# Patient Record
Sex: FEMALE | Race: WHITE | Employment: FULL TIME | URBAN - METROPOLITAN AREA
[De-identification: names, ages, dates, MRNs, and addresses within clinical notes are randomized per-mention and may not be internally consistent; named-entity substitution may affect disease eponyms.]

---

## 2019-06-12 ENCOUNTER — HOSPITAL ENCOUNTER (OUTPATIENT)
Dept: PREADMISSION TESTING | Age: 64
Discharge: HOME | End: 2019-06-16
Payer: COMMERCIAL

## 2019-06-12 VITALS
SYSTOLIC BLOOD PRESSURE: 129 MMHG | HEART RATE: 56 BPM | WEIGHT: 132.2 LBS | OXYGEN SATURATION: 98 % | DIASTOLIC BLOOD PRESSURE: 74 MMHG | BODY MASS INDEX: 21.24 KG/M2 | TEMPERATURE: 98.1 F | RESPIRATION RATE: 16 BRPM | HEIGHT: 66 IN

## 2019-06-12 DIAGNOSIS — J34.9 UNSPECIFIED DISORDER OF NOSE AND NASAL SINUSES: ICD-10-CM

## 2019-06-12 DIAGNOSIS — Z98.890: ICD-10-CM

## 2019-06-12 DIAGNOSIS — J32.2 CHRONIC ETHMOIDAL SINUSITIS: ICD-10-CM

## 2019-06-12 DIAGNOSIS — J34.3 HYPERTROPHY OF NASAL TURBINATES: ICD-10-CM

## 2019-06-12 DIAGNOSIS — J32.0 CHRONIC MAXILLARY SINUSITIS: ICD-10-CM

## 2019-06-12 DIAGNOSIS — J34.2 DEVIATED NASAL SEPTUM: ICD-10-CM

## 2019-06-12 DIAGNOSIS — J32.3 CHRONIC SPHENOIDAL SINUSITIS: ICD-10-CM

## 2019-06-12 LAB
ANION GAP SERPL CALCULATED.3IONS-SCNC: 15 MEQ/L (ref 9–15)
BUN BLDV-MCNC: 13 MG/DL (ref 8–23)
CALCIUM SERPL-MCNC: 9.6 MG/DL (ref 8.5–9.9)
CHLORIDE BLD-SCNC: 106 MEQ/L (ref 95–107)
CO2: 23 MEQ/L (ref 20–31)
CREAT SERPL-MCNC: 0.47 MG/DL (ref 0.5–0.9)
EKG ATRIAL RATE: 59 BPM
EKG P AXIS: 45 DEGREES
EKG P-R INTERVAL: 168 MS
EKG Q-T INTERVAL: 434 MS
EKG QRS DURATION: 82 MS
EKG QTC CALCULATION (BAZETT): 429 MS
EKG R AXIS: 42 DEGREES
EKG T AXIS: 28 DEGREES
EKG VENTRICULAR RATE: 59 BPM
GFR AFRICAN AMERICAN: >60
GFR NON-AFRICAN AMERICAN: >60
GLUCOSE BLD-MCNC: 92 MG/DL (ref 70–99)
HCT VFR BLD CALC: 40.1 % (ref 37–47)
HEMOGLOBIN: 13.7 G/DL (ref 12–16)
MCH RBC QN AUTO: 30.1 PG (ref 27–31.3)
MCHC RBC AUTO-ENTMCNC: 34.1 % (ref 33–37)
MCV RBC AUTO: 88.3 FL (ref 82–100)
PDW BLD-RTO: 13.3 % (ref 11.5–14.5)
PLATELET # BLD: 223 K/UL (ref 130–400)
POTASSIUM SERPL-SCNC: 4.3 MEQ/L (ref 3.4–4.9)
RBC # BLD: 4.54 M/UL (ref 4.2–5.4)
SODIUM BLD-SCNC: 144 MEQ/L (ref 135–144)
WBC # BLD: 5 K/UL (ref 4.8–10.8)

## 2019-06-12 PROCEDURE — 93005 ELECTROCARDIOGRAM TRACING: CPT | Performed by: NURSE PRACTITIONER

## 2019-06-12 PROCEDURE — 80048 BASIC METABOLIC PNL TOTAL CA: CPT

## 2019-06-12 PROCEDURE — 85027 COMPLETE CBC AUTOMATED: CPT

## 2019-06-12 RX ORDER — SODIUM CHLORIDE 0.9 % (FLUSH) 0.9 %
10 SYRINGE (ML) INJECTION EVERY 12 HOURS SCHEDULED
Status: CANCELLED | OUTPATIENT
Start: 2019-06-20

## 2019-06-12 RX ORDER — M-VIT,TX,IRON,MINS/CALC/FOLIC 27MG-0.4MG
1 TABLET ORAL DAILY
COMMUNITY

## 2019-06-12 RX ORDER — VITAMIN E 268 MG
400 CAPSULE ORAL DAILY
COMMUNITY

## 2019-06-12 RX ORDER — CALCIUM CARBONATE/VITAMIN D3 600 MG-10
1 TABLET ORAL DAILY
COMMUNITY

## 2019-06-12 RX ORDER — SODIUM CHLORIDE, SODIUM LACTATE, POTASSIUM CHLORIDE, CALCIUM CHLORIDE 600; 310; 30; 20 MG/100ML; MG/100ML; MG/100ML; MG/100ML
INJECTION, SOLUTION INTRAVENOUS CONTINUOUS
Status: CANCELLED | OUTPATIENT
Start: 2019-06-20

## 2019-06-12 RX ORDER — SODIUM CHLORIDE 0.9 % (FLUSH) 0.9 %
10 SYRINGE (ML) INJECTION PRN
Status: CANCELLED | OUTPATIENT
Start: 2019-06-20

## 2019-06-12 RX ORDER — RANITIDINE 150 MG/1
150 TABLET ORAL NIGHTLY PRN
COMMUNITY
Start: 2014-02-28

## 2019-06-12 RX ORDER — CEFAZOLIN SODIUM 2 G/50ML
2 SOLUTION INTRAVENOUS ONCE
Status: CANCELLED | OUTPATIENT
Start: 2019-06-20

## 2019-06-12 RX ORDER — IPRATROPIUM BROMIDE 21 UG/1
2 SPRAY, METERED NASAL DAILY
COMMUNITY
End: 2021-07-29 | Stop reason: SDUPTHER

## 2019-06-12 RX ORDER — LIDOCAINE HYDROCHLORIDE 10 MG/ML
1 INJECTION, SOLUTION EPIDURAL; INFILTRATION; INTRACAUDAL; PERINEURAL
Status: CANCELLED | OUTPATIENT
Start: 2019-06-20 | End: 2019-06-20

## 2019-06-12 RX ORDER — ACETAMINOPHEN 325 MG/1
325-650 TABLET ORAL
Status: ON HOLD | COMMUNITY
Start: 2014-01-24 | End: 2019-06-20 | Stop reason: HOSPADM

## 2019-06-12 SDOH — HEALTH STABILITY: MENTAL HEALTH: HOW OFTEN DO YOU HAVE A DRINK CONTAINING ALCOHOL?: NEVER

## 2019-06-12 ASSESSMENT — ENCOUNTER SYMPTOMS
WHEEZING: 0
VOMITING: 0
EYE PAIN: 0
COUGH: 0
NAUSEA: 0
CONSTIPATION: 0
SINUS PAIN: 1
EYE DISCHARGE: 0
SORE THROAT: 0
ABDOMINAL PAIN: 0
DIARRHEA: 0
TROUBLE SWALLOWING: 0
RHINORRHEA: 1
APNEA: 0
SINUS PRESSURE: 1
BLOOD IN STOOL: 0
SHORTNESS OF BREATH: 0

## 2019-06-12 NOTE — H&P (VIEW-ONLY)
Nurse Practitioner History and Physical      CHIEF COMPLAINT:  Chronic sinus issues    HISTORY OF PRESENT ILLNESS:      The patient is a 66 y.o. female with significant past medical history of deviated nasal septum, hypertrophy of nasal turbinates, chronic ethmoidal, maxillary and sphenoidal sinusitis who presents for septoplasty, microdebrider assisted turbinoplasty and out-fracturing balloon sinuplasty of right maxillary, bilateral sphenoid sinus, resection of left keesha bullosa. Patient with chronic sinus issues. Scheduled for surgery.      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         Medications Prior to Admission:    Current Outpatient Medications   Medication Sig Dispense Refill    ranitidine (ZANTAC) 150 MG tablet Take 150 mg by mouth      acetaminophen (TYLENOL) 325 MG tablet Take 325-650 mg by mouth      OMEPRAZOLE PO Take 20 mg by mouth daily      Probiotic Product (ALIGN PO) Take by mouth daily      Calcium Polycarbophil (FIBER-CAPS PO) Take 1 capsule by mouth daily      Multiple Vitamins-Minerals (THERAPEUTIC MULTIVITAMIN-MINERALS) tablet Take 1 tablet by mouth daily      LUTEIN-ZEAXANTHIN PO Take 1 tablet by mouth daily      vitamin E 400 UNIT capsule Take 400 Units by mouth daily      GLUCOSAMINE-CHONDROITIN DS PO Take 1 tablet by mouth daily      Calcium Carb-Cholecalciferol (CALCIUM-VITAMIN D) 500-200 MG-UNIT per tablet Take 1 tablet by mouth daily      Omega 3 1200 MG CAPS Take 1 capsule by mouth daily      ipratropium (ATROVENT) 0.03 % nasal spray 2 sprays by Each Nostril route daily       No current facility-administered medications for this encounter. Allergies:  Aspirin; Augmentin [amoxicillin-pot clavulanate]; Codeine; and Pseudoephedrine    Social History:   Social History     Socioeconomic History    Marital status:       Spouse name: Not on file    Number of children: Not on file    Years of education: Not on file    Highest education level: Not on file   Occupational History    Not on file   Social Needs    Financial resource strain: Not on file    Food insecurity:     Worry: Not on file     Inability: Not on file    Transportation needs:     Medical: Not on file     Non-medical: Not on file   Tobacco Use    Smoking status: Former Smoker     Last attempt to quit: 6/12/2009     Years since quitting: 10.0    Smokeless tobacco: Never Used   Substance and Sexual Activity    Alcohol use: Never     Frequency: Never    Drug use: Never    Sexual activity: Not on file   Lifestyle    Physical activity:     Days per week: Not on file     Minutes per session: Not on file    Stress: Not on file   Relationships    Social connections:     Talks on phone: Not on file     Gets together: Not on file     Attends Worship service: Not on file     Active member of club or organization: Not on file     Attends meetings of clubs or organizations: Not on file     Relationship status: Not on file    Intimate partner violence:     Fear of current or ex partner: Not on file     Emotionally abused: Not on file     Physically abused: Not on file     Forced sexual activity: Not on file   Other Topics Concern    Not on file   Social History Narrative    Not on file       Family History:       Problem Relation Age of Onset    Stroke Mother         several     High Cholesterol Mother     Heart Disease Father     Heart Attack Father     No Known Problems Sister     High Blood Pressure Brother     High Cholesterol Brother     Mental Illness Daughter bipolar, ADHD    No Known Problems Son        Review of Systems   Constitutional: Negative for chills, fatigue and fever. HENT: Positive for congestion, rhinorrhea, sinus pressure and sinus pain. Negative for ear discharge, ear pain, postnasal drip, sneezing, sore throat and trouble swallowing. Eyes: Negative for pain, discharge and visual disturbance. Respiratory: Negative for apnea, cough, shortness of breath and wheezing. Cardiovascular: Negative for chest pain, palpitations and leg swelling. Gastrointestinal: Negative for abdominal pain, blood in stool, constipation, diarrhea, nausea and vomiting. Upset stomach at times, takes ranitidine and omeprazole   Endocrine: Negative for polydipsia, polyphagia and polyuria. Genitourinary: Negative for difficulty urinating, dysuria, frequency and hematuria. Musculoskeletal: Negative for myalgias. Skin: Negative for rash and wound. Allergic/Immunologic: Positive for environmental allergies. Neurological: Positive for headaches (at times due to sinuses). Negative for dizziness, seizures and light-headedness. Denies LOC and recent falls   Hematological: Does not bruise/bleed easily. Psychiatric/Behavioral: Negative for dysphoric mood. The patient is nervous/anxious (at times). Vitals:  /74   Pulse 56   Temp 98.1 °F (36.7 °C) (Temporal)   Resp 16   Ht 5' 5.5\" (1.664 m)   Wt 132 lb 3.2 oz (60 kg)   LMP 06/12/2004   SpO2 98%   BMI 21.66 kg/m²     Physical Exam   Constitutional: She is oriented to person, place, and time. Vital signs are normal. She appears well-developed and well-nourished. She does not have a sickly appearance. HENT:   Head: Normocephalic and atraumatic. Right Ear: Hearing, tympanic membrane, external ear and ear canal normal.   Left Ear: Hearing, tympanic membrane, external ear and ear canal normal.   Nose: Mucosal edema and septal deviation present. No rhinorrhea or sinus tenderness.  Right sinus exhibits maxillary sinus tenderness. Right sinus exhibits no frontal sinus tenderness. Left sinus exhibits maxillary sinus tenderness. Left sinus exhibits no frontal sinus tenderness. Mouth/Throat: Uvula is midline, oropharynx is clear and moist and mucous membranes are normal.   Eyes: Pupils are equal, round, and reactive to light. Conjunctivae, EOM and lids are normal.   Neck: Normal range of motion. Neck supple. No JVD present. Carotid bruit is not present. Cardiovascular: Normal rate, regular rhythm, S1 normal, S2 normal and normal heart sounds. No murmur heard. Pulmonary/Chest: Effort normal and breath sounds normal. She has no wheezes. Abdominal: Soft. Normal appearance and bowel sounds are normal. There is no hepatosplenomegaly. There is no tenderness. There is no CVA tenderness. Genitourinary:   Genitourinary Comments: Exam deferred   Musculoskeletal: Normal range of motion. Lymphadenopathy:     She has no cervical adenopathy. Right: No supraclavicular adenopathy present. Left: No supraclavicular adenopathy present. Neurological: She is alert and oriented to person, place, and time. Skin: Skin is warm and dry. Capillary refill takes less than 2 seconds. Psychiatric: She has a normal mood and affect. Her speech is normal.         Assessment:  Patient Active Problem List   Diagnosis    Deviated nasal septum    Hypertrophy of nasal turbinates    Chronic maxillary sinusitis    Chronic sphenoidal sinusitis    Unspecified disorder of nose and nasal sinuses    Chronic ethmoidal sinusitis    History of ethmoidectomy         Plan:  Scheduled for sphenoidal sinusitis who presents for septoplasty, microdebrider assisted turbinoplasty and out-fracturing balloon sinuplasty of right maxillary, bilateral sphenoid sinus, resection of left keesha bullosa.      Kacey Lakhani, BARTOLO - CNP  6/12/2019  10:35 AM

## 2019-06-12 NOTE — H&P
Nurse Practitioner History and Physical      CHIEF COMPLAINT:  Chronic sinus issues    HISTORY OF PRESENT ILLNESS:      The patient is a 66 y.o. female with significant past medical history of deviated nasal septum, hypertrophy of nasal turbinates, chronic ethmoidal, maxillary and sphenoidal sinusitis who presents for septoplasty, microdebrider assisted turbinoplasty and out-fracturing balloon sinuplasty of right maxillary, bilateral sphenoid sinus, resection of left keesha bullosa. Patient with chronic sinus issues. Scheduled for surgery.      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         Medications Prior to Admission:    Current Outpatient Medications   Medication Sig Dispense Refill    ranitidine (ZANTAC) 150 MG tablet Take 150 mg by mouth      acetaminophen (TYLENOL) 325 MG tablet Take 325-650 mg by mouth      OMEPRAZOLE PO Take 20 mg by mouth daily      Probiotic Product (ALIGN PO) Take by mouth daily      Calcium Polycarbophil (FIBER-CAPS PO) Take 1 capsule by mouth daily      Multiple Vitamins-Minerals (THERAPEUTIC MULTIVITAMIN-MINERALS) tablet Take 1 tablet by mouth daily      LUTEIN-ZEAXANTHIN PO Take 1 tablet by mouth daily      vitamin E 400 UNIT capsule Take 400 Units by mouth daily      GLUCOSAMINE-CHONDROITIN DS PO Take 1 tablet by mouth daily      Calcium Carb-Cholecalciferol (CALCIUM-VITAMIN D) 500-200 MG-UNIT per tablet Take 1 tablet by mouth daily      Omega 3 1200 MG CAPS Take 1 capsule by mouth daily      ipratropium (ATROVENT) 0.03 % nasal spray 2 sprays by Each Nostril route daily       No current facility-administered medications for this encounter. Allergies:  Aspirin; Augmentin [amoxicillin-pot clavulanate]; Codeine; and Pseudoephedrine    Social History:   Social History     Socioeconomic History    Marital status:       Spouse name: Not on file    Number of children: Not on file    Years of education: Not on file    Highest education level: Not on file   Occupational History    Not on file   Social Needs    Financial resource strain: Not on file    Food insecurity:     Worry: Not on file     Inability: Not on file    Transportation needs:     Medical: Not on file     Non-medical: Not on file   Tobacco Use    Smoking status: Former Smoker     Last attempt to quit: 6/12/2009     Years since quitting: 10.0    Smokeless tobacco: Never Used   Substance and Sexual Activity    Alcohol use: Never     Frequency: Never    Drug use: Never    Sexual activity: Not on file   Lifestyle    Physical activity:     Days per week: Not on file     Minutes per session: Not on file    Stress: Not on file   Relationships    Social connections:     Talks on phone: Not on file     Gets together: Not on file     Attends Jew service: Not on file     Active member of club or organization: Not on file     Attends meetings of clubs or organizations: Not on file     Relationship status: Not on file    Intimate partner violence:     Fear of current or ex partner: Not on file     Emotionally abused: Not on file     Physically abused: Not on file     Forced sexual activity: Not on file   Other Topics Concern    Not on file   Social History Narrative    Not on file       Family History:       Problem Relation Age of Onset    Stroke Mother         several     High Cholesterol Mother     Heart Disease Father     Heart Attack Father     No Known Problems Sister     High Blood Pressure Brother     High Cholesterol Brother     Mental Illness Daughter bipolar, ADHD    No Known Problems Son        Review of Systems   Constitutional: Negative for chills, fatigue and fever. HENT: Positive for congestion, rhinorrhea, sinus pressure and sinus pain. Negative for ear discharge, ear pain, postnasal drip, sneezing, sore throat and trouble swallowing. Eyes: Negative for pain, discharge and visual disturbance. Respiratory: Negative for apnea, cough, shortness of breath and wheezing. Cardiovascular: Negative for chest pain, palpitations and leg swelling. Gastrointestinal: Negative for abdominal pain, blood in stool, constipation, diarrhea, nausea and vomiting. Upset stomach at times, takes ranitidine and omeprazole   Endocrine: Negative for polydipsia, polyphagia and polyuria. Genitourinary: Negative for difficulty urinating, dysuria, frequency and hematuria. Musculoskeletal: Negative for myalgias. Skin: Negative for rash and wound. Allergic/Immunologic: Positive for environmental allergies. Neurological: Positive for headaches (at times due to sinuses). Negative for dizziness, seizures and light-headedness. Denies LOC and recent falls   Hematological: Does not bruise/bleed easily. Psychiatric/Behavioral: Negative for dysphoric mood. The patient is nervous/anxious (at times). Vitals:  /74   Pulse 56   Temp 98.1 °F (36.7 °C) (Temporal)   Resp 16   Ht 5' 5.5\" (1.664 m)   Wt 132 lb 3.2 oz (60 kg)   LMP 06/12/2004   SpO2 98%   BMI 21.66 kg/m²     Physical Exam   Constitutional: She is oriented to person, place, and time. Vital signs are normal. She appears well-developed and well-nourished. She does not have a sickly appearance. HENT:   Head: Normocephalic and atraumatic. Right Ear: Hearing, tympanic membrane, external ear and ear canal normal.   Left Ear: Hearing, tympanic membrane, external ear and ear canal normal.   Nose: Mucosal edema and septal deviation present. No rhinorrhea or sinus tenderness.  Right sinus

## 2019-06-14 PROCEDURE — 93010 ELECTROCARDIOGRAM REPORT: CPT | Performed by: INTERNAL MEDICINE

## 2019-06-20 ENCOUNTER — ANESTHESIA EVENT (OUTPATIENT)
Dept: OPERATING ROOM | Age: 64
End: 2019-06-20
Payer: COMMERCIAL

## 2019-06-20 ENCOUNTER — HOSPITAL ENCOUNTER (OUTPATIENT)
Age: 64
Setting detail: OUTPATIENT SURGERY
Discharge: HOME | End: 2019-06-20
Attending: OTOLARYNGOLOGY | Admitting: OTOLARYNGOLOGY
Payer: COMMERCIAL

## 2019-06-20 ENCOUNTER — HOSPITAL ENCOUNTER (OUTPATIENT)
Dept: GENERAL RADIOLOGY | Age: 64
Setting detail: OUTPATIENT SURGERY
Discharge: HOME | End: 2019-06-22
Attending: OTOLARYNGOLOGY
Payer: COMMERCIAL

## 2019-06-20 ENCOUNTER — ANESTHESIA (OUTPATIENT)
Dept: OPERATING ROOM | Age: 64
End: 2019-06-20
Payer: COMMERCIAL

## 2019-06-20 VITALS — OXYGEN SATURATION: 100 % | SYSTOLIC BLOOD PRESSURE: 154 MMHG | TEMPERATURE: 95.9 F | DIASTOLIC BLOOD PRESSURE: 77 MMHG

## 2019-06-20 VITALS
RESPIRATION RATE: 16 BRPM | TEMPERATURE: 97.3 F | HEIGHT: 66 IN | WEIGHT: 132.2 LBS | OXYGEN SATURATION: 97 % | SYSTOLIC BLOOD PRESSURE: 146 MMHG | HEART RATE: 71 BPM | BODY MASS INDEX: 21.24 KG/M2 | DIASTOLIC BLOOD PRESSURE: 80 MMHG

## 2019-06-20 DIAGNOSIS — J32.0 CHRONIC MAXILLARY SINUSITIS: Primary | ICD-10-CM

## 2019-06-20 DIAGNOSIS — J34.2 DEVIATED NASAL SEPTUM: ICD-10-CM

## 2019-06-20 DIAGNOSIS — R52 PAIN: ICD-10-CM

## 2019-06-20 DIAGNOSIS — J34.9 UNSPECIFIED DISORDER OF NOSE AND NASAL SINUSES: ICD-10-CM

## 2019-06-20 DIAGNOSIS — J34.3 HYPERTROPHY OF NASAL TURBINATES: ICD-10-CM

## 2019-06-20 DIAGNOSIS — J32.3 CHRONIC SPHENOIDAL SINUSITIS: ICD-10-CM

## 2019-06-20 DIAGNOSIS — J32.2 CHRONIC ETHMOIDAL SINUSITIS: ICD-10-CM

## 2019-06-20 PROCEDURE — 2709999900 HC NON-CHARGEABLE SUPPLY: Performed by: OTOLARYNGOLOGY

## 2019-06-20 PROCEDURE — 3209999900 FLUORO FOR SURGICAL PROCEDURES

## 2019-06-20 PROCEDURE — 6370000000 HC RX 637 (ALT 250 FOR IP): Performed by: OTOLARYNGOLOGY

## 2019-06-20 PROCEDURE — 2500000003 HC RX 250 WO HCPCS: Performed by: NURSE ANESTHETIST, CERTIFIED REGISTERED

## 2019-06-20 PROCEDURE — C1729 CATH, DRAINAGE: HCPCS | Performed by: OTOLARYNGOLOGY

## 2019-06-20 PROCEDURE — 6370000000 HC RX 637 (ALT 250 FOR IP): Performed by: ANESTHESIOLOGY

## 2019-06-20 PROCEDURE — 2500000003 HC RX 250 WO HCPCS: Performed by: OTOLARYNGOLOGY

## 2019-06-20 PROCEDURE — 88305 TISSUE EXAM BY PATHOLOGIST: CPT

## 2019-06-20 PROCEDURE — C1887 CATHETER, GUIDING: HCPCS | Performed by: OTOLARYNGOLOGY

## 2019-06-20 PROCEDURE — 7100000010 HC PHASE II RECOVERY - FIRST 15 MIN: Performed by: OTOLARYNGOLOGY

## 2019-06-20 PROCEDURE — 7100000001 HC PACU RECOVERY - ADDTL 15 MIN: Performed by: OTOLARYNGOLOGY

## 2019-06-20 PROCEDURE — 88304 TISSUE EXAM BY PATHOLOGIST: CPT

## 2019-06-20 PROCEDURE — 2720000010 HC SURG SUPPLY STERILE: Performed by: OTOLARYNGOLOGY

## 2019-06-20 PROCEDURE — 2500000003 HC RX 250 WO HCPCS: Performed by: NURSE PRACTITIONER

## 2019-06-20 PROCEDURE — 6360000002 HC RX W HCPCS: Performed by: ANESTHESIOLOGY

## 2019-06-20 PROCEDURE — C1726 CATH, BAL DIL, NON-VASCULAR: HCPCS | Performed by: OTOLARYNGOLOGY

## 2019-06-20 PROCEDURE — 2580000003 HC RX 258: Performed by: NURSE PRACTITIONER

## 2019-06-20 PROCEDURE — 7100000000 HC PACU RECOVERY - FIRST 15 MIN: Performed by: OTOLARYNGOLOGY

## 2019-06-20 PROCEDURE — 3600000014 HC SURGERY LEVEL 4 ADDTL 15MIN: Performed by: OTOLARYNGOLOGY

## 2019-06-20 PROCEDURE — 6360000002 HC RX W HCPCS: Performed by: NURSE ANESTHETIST, CERTIFIED REGISTERED

## 2019-06-20 PROCEDURE — 3700000001 HC ADD 15 MINUTES (ANESTHESIA): Performed by: OTOLARYNGOLOGY

## 2019-06-20 PROCEDURE — 2580000003 HC RX 258: Performed by: OTOLARYNGOLOGY

## 2019-06-20 PROCEDURE — 3700000000 HC ANESTHESIA ATTENDED CARE: Performed by: OTOLARYNGOLOGY

## 2019-06-20 PROCEDURE — 88311 DECALCIFY TISSUE: CPT

## 2019-06-20 PROCEDURE — 3600000004 HC SURGERY LEVEL 4 BASE: Performed by: OTOLARYNGOLOGY

## 2019-06-20 PROCEDURE — 7100000011 HC PHASE II RECOVERY - ADDTL 15 MIN: Performed by: OTOLARYNGOLOGY

## 2019-06-20 RX ORDER — OXYCODONE HYDROCHLORIDE AND ACETAMINOPHEN 5; 325 MG/1; MG/1
1 TABLET ORAL EVERY 4 HOURS PRN
Status: DISCONTINUED | OUTPATIENT
Start: 2019-06-20 | End: 2019-06-20 | Stop reason: HOSPADM

## 2019-06-20 RX ORDER — SODIUM CHLORIDE, SODIUM LACTATE, POTASSIUM CHLORIDE, CALCIUM CHLORIDE 600; 310; 30; 20 MG/100ML; MG/100ML; MG/100ML; MG/100ML
INJECTION, SOLUTION INTRAVENOUS CONTINUOUS
Status: DISCONTINUED | OUTPATIENT
Start: 2019-06-20 | End: 2019-06-20 | Stop reason: HOSPADM

## 2019-06-20 RX ORDER — METOCLOPRAMIDE HYDROCHLORIDE 5 MG/ML
10 INJECTION INTRAMUSCULAR; INTRAVENOUS
Status: DISCONTINUED | OUTPATIENT
Start: 2019-06-20 | End: 2019-06-20 | Stop reason: HOSPADM

## 2019-06-20 RX ORDER — PROPOFOL 10 MG/ML
INJECTION, EMULSION INTRAVENOUS PRN
Status: DISCONTINUED | OUTPATIENT
Start: 2019-06-20 | End: 2019-06-20 | Stop reason: SDUPTHER

## 2019-06-20 RX ORDER — CLINDAMYCIN HYDROCHLORIDE 300 MG/1
300 CAPSULE ORAL 3 TIMES DAILY
Qty: 30 CAPSULE | Refills: 0 | Status: SHIPPED | OUTPATIENT
Start: 2019-06-20 | End: 2019-12-26 | Stop reason: ALTCHOICE

## 2019-06-20 RX ORDER — LIDOCAINE HYDROCHLORIDE AND EPINEPHRINE 10; 10 MG/ML; UG/ML
INJECTION, SOLUTION INFILTRATION; PERINEURAL PRN
Status: DISCONTINUED | OUTPATIENT
Start: 2019-06-20 | End: 2019-06-20 | Stop reason: ALTCHOICE

## 2019-06-20 RX ORDER — ROCURONIUM BROMIDE 10 MG/ML
INJECTION, SOLUTION INTRAVENOUS PRN
Status: DISCONTINUED | OUTPATIENT
Start: 2019-06-20 | End: 2019-06-20 | Stop reason: SDUPTHER

## 2019-06-20 RX ORDER — ONDANSETRON 2 MG/ML
4 INJECTION INTRAMUSCULAR; INTRAVENOUS
Status: COMPLETED | OUTPATIENT
Start: 2019-06-20 | End: 2019-06-20

## 2019-06-20 RX ORDER — SCOLOPAMINE TRANSDERMAL SYSTEM 1 MG/1
1 PATCH, EXTENDED RELEASE TRANSDERMAL
Status: DISCONTINUED | OUTPATIENT
Start: 2019-06-20 | End: 2019-06-20 | Stop reason: HOSPADM

## 2019-06-20 RX ORDER — DIPHENHYDRAMINE HYDROCHLORIDE 50 MG/ML
12.5 INJECTION INTRAMUSCULAR; INTRAVENOUS
Status: DISCONTINUED | OUTPATIENT
Start: 2019-06-20 | End: 2019-06-20 | Stop reason: HOSPADM

## 2019-06-20 RX ORDER — HYDROCODONE BITARTRATE AND ACETAMINOPHEN 5; 325 MG/1; MG/1
1 TABLET ORAL EVERY 4 HOURS PRN
Qty: 20 TABLET | Refills: 0 | Status: SHIPPED | OUTPATIENT
Start: 2019-06-20 | End: 2019-06-27

## 2019-06-20 RX ORDER — MAGNESIUM HYDROXIDE 1200 MG/15ML
LIQUID ORAL CONTINUOUS PRN
Status: COMPLETED | OUTPATIENT
Start: 2019-06-20 | End: 2019-06-20

## 2019-06-20 RX ORDER — MIDAZOLAM HYDROCHLORIDE 1 MG/ML
INJECTION INTRAMUSCULAR; INTRAVENOUS PRN
Status: DISCONTINUED | OUTPATIENT
Start: 2019-06-20 | End: 2019-06-20 | Stop reason: SDUPTHER

## 2019-06-20 RX ORDER — MEPERIDINE HYDROCHLORIDE 25 MG/ML
12.5 INJECTION INTRAMUSCULAR; INTRAVENOUS; SUBCUTANEOUS EVERY 5 MIN PRN
Status: DISCONTINUED | OUTPATIENT
Start: 2019-06-20 | End: 2019-06-20 | Stop reason: HOSPADM

## 2019-06-20 RX ORDER — SODIUM CHLORIDE 0.9 % (FLUSH) 0.9 %
10 SYRINGE (ML) INJECTION EVERY 12 HOURS SCHEDULED
Status: DISCONTINUED | OUTPATIENT
Start: 2019-06-20 | End: 2019-06-20 | Stop reason: HOSPADM

## 2019-06-20 RX ORDER — DEXAMETHASONE SODIUM PHOSPHATE 10 MG/ML
INJECTION INTRAMUSCULAR; INTRAVENOUS PRN
Status: DISCONTINUED | OUTPATIENT
Start: 2019-06-20 | End: 2019-06-20 | Stop reason: SDUPTHER

## 2019-06-20 RX ORDER — SODIUM CHLORIDE 0.9 % (FLUSH) 0.9 %
10 SYRINGE (ML) INJECTION PRN
Status: DISCONTINUED | OUTPATIENT
Start: 2019-06-20 | End: 2019-06-20 | Stop reason: HOSPADM

## 2019-06-20 RX ORDER — FENTANYL CITRATE 50 UG/ML
INJECTION, SOLUTION INTRAMUSCULAR; INTRAVENOUS PRN
Status: DISCONTINUED | OUTPATIENT
Start: 2019-06-20 | End: 2019-06-20 | Stop reason: SDUPTHER

## 2019-06-20 RX ORDER — CLINDAMYCIN PHOSPHATE 600 MG/50ML
600 INJECTION INTRAVENOUS
Status: COMPLETED | OUTPATIENT
Start: 2019-06-20 | End: 2019-06-20

## 2019-06-20 RX ORDER — LIDOCAINE HYDROCHLORIDE 10 MG/ML
1 INJECTION, SOLUTION EPIDURAL; INFILTRATION; INTRACAUDAL; PERINEURAL
Status: COMPLETED | OUTPATIENT
Start: 2019-06-20 | End: 2019-06-20

## 2019-06-20 RX ORDER — FENTANYL CITRATE 50 UG/ML
50 INJECTION, SOLUTION INTRAMUSCULAR; INTRAVENOUS EVERY 10 MIN PRN
Status: DISCONTINUED | OUTPATIENT
Start: 2019-06-20 | End: 2019-06-20 | Stop reason: HOSPADM

## 2019-06-20 RX ORDER — LIDOCAINE HYDROCHLORIDE 20 MG/ML
INJECTION, SOLUTION INTRAVENOUS PRN
Status: DISCONTINUED | OUTPATIENT
Start: 2019-06-20 | End: 2019-06-20 | Stop reason: SDUPTHER

## 2019-06-20 RX ORDER — ONDANSETRON 2 MG/ML
INJECTION INTRAMUSCULAR; INTRAVENOUS PRN
Status: DISCONTINUED | OUTPATIENT
Start: 2019-06-20 | End: 2019-06-20 | Stop reason: SDUPTHER

## 2019-06-20 RX ADMIN — ROCURONIUM BROMIDE 10 MG: 10 INJECTION INTRAVENOUS at 13:58

## 2019-06-20 RX ADMIN — DEXAMETHASONE SODIUM PHOSPHATE 10 MG: 10 INJECTION INTRAMUSCULAR; INTRAVENOUS at 13:17

## 2019-06-20 RX ADMIN — LIDOCAINE HYDROCHLORIDE 0.1 ML: 10 INJECTION, SOLUTION EPIDURAL; INFILTRATION; INTRACAUDAL; PERINEURAL at 11:38

## 2019-06-20 RX ADMIN — ONDANSETRON 4 MG: 2 INJECTION INTRAMUSCULAR; INTRAVENOUS at 13:17

## 2019-06-20 RX ADMIN — FENTANYL CITRATE 50 MCG: 50 INJECTION, SOLUTION INTRAMUSCULAR; INTRAVENOUS at 13:18

## 2019-06-20 RX ADMIN — SUGAMMADEX 200 MG: 100 INJECTION, SOLUTION INTRAVENOUS at 14:24

## 2019-06-20 RX ADMIN — CLINDAMYCIN PHOSPHATE 600 MG: 600 INJECTION, SOLUTION INTRAVENOUS at 12:51

## 2019-06-20 RX ADMIN — FENTANYL CITRATE 50 MCG: 50 INJECTION, SOLUTION INTRAMUSCULAR; INTRAVENOUS at 12:56

## 2019-06-20 RX ADMIN — ONDANSETRON 4 MG: 2 INJECTION INTRAMUSCULAR; INTRAVENOUS at 15:03

## 2019-06-20 RX ADMIN — OXYCODONE HYDROCHLORIDE AND ACETAMINOPHEN 1 TABLET: 5; 325 TABLET ORAL at 15:45

## 2019-06-20 RX ADMIN — SODIUM CHLORIDE, POTASSIUM CHLORIDE, SODIUM LACTATE AND CALCIUM CHLORIDE: 600; 310; 30; 20 INJECTION, SOLUTION INTRAVENOUS at 11:39

## 2019-06-20 RX ADMIN — LIDOCAINE HYDROCHLORIDE 50 MG: 20 INJECTION, SOLUTION INTRAVENOUS at 12:56

## 2019-06-20 RX ADMIN — MIDAZOLAM HYDROCHLORIDE 2 MG: 1 INJECTION, SOLUTION INTRAMUSCULAR; INTRAVENOUS at 12:51

## 2019-06-20 RX ADMIN — SODIUM CHLORIDE, POTASSIUM CHLORIDE, SODIUM LACTATE AND CALCIUM CHLORIDE: 600; 310; 30; 20 INJECTION, SOLUTION INTRAVENOUS at 12:51

## 2019-06-20 RX ADMIN — PROPOFOL 160 MG: 10 INJECTION, EMULSION INTRAVENOUS at 12:56

## 2019-06-20 RX ADMIN — SODIUM CHLORIDE, POTASSIUM CHLORIDE, SODIUM LACTATE AND CALCIUM CHLORIDE: 600; 310; 30; 20 INJECTION, SOLUTION INTRAVENOUS at 14:16

## 2019-06-20 RX ADMIN — ROCURONIUM BROMIDE 50 MG: 10 INJECTION INTRAVENOUS at 12:56

## 2019-06-20 ASSESSMENT — PULMONARY FUNCTION TESTS
PIF_VALUE: 14
PIF_VALUE: 14
PIF_VALUE: 15
PIF_VALUE: 1
PIF_VALUE: 15
PIF_VALUE: 14
PIF_VALUE: 1
PIF_VALUE: 14
PIF_VALUE: 14
PIF_VALUE: 15
PIF_VALUE: 14
PIF_VALUE: 15
PIF_VALUE: 1
PIF_VALUE: 15
PIF_VALUE: 6
PIF_VALUE: 14
PIF_VALUE: 13
PIF_VALUE: 15
PIF_VALUE: 14
PIF_VALUE: 14
PIF_VALUE: 15
PIF_VALUE: 14
PIF_VALUE: 14
PIF_VALUE: 1
PIF_VALUE: 1
PIF_VALUE: 13
PIF_VALUE: 14
PIF_VALUE: 15
PIF_VALUE: 14
PIF_VALUE: 1
PIF_VALUE: 1
PIF_VALUE: 14
PIF_VALUE: 15
PIF_VALUE: 14
PIF_VALUE: 15
PIF_VALUE: 16
PIF_VALUE: 14
PIF_VALUE: 14
PIF_VALUE: 15
PIF_VALUE: 15
PIF_VALUE: 14
PIF_VALUE: 14
PIF_VALUE: 15
PIF_VALUE: 15
PIF_VALUE: 14
PIF_VALUE: 13
PIF_VALUE: 14
PIF_VALUE: 13
PIF_VALUE: 15
PIF_VALUE: 14
PIF_VALUE: 14
PIF_VALUE: 17
PIF_VALUE: 2
PIF_VALUE: 14
PIF_VALUE: 2
PIF_VALUE: 15
PIF_VALUE: 14
PIF_VALUE: 1
PIF_VALUE: 14
PIF_VALUE: 2
PIF_VALUE: 14
PIF_VALUE: 13
PIF_VALUE: 14
PIF_VALUE: 2
PIF_VALUE: 14
PIF_VALUE: 15

## 2019-06-20 ASSESSMENT — PAIN DESCRIPTION - DESCRIPTORS: DESCRIPTORS: HEADACHE;THROBBING

## 2019-06-20 ASSESSMENT — PAIN SCALES - GENERAL: PAINLEVEL_OUTOF10: 6

## 2019-06-20 ASSESSMENT — PAIN - FUNCTIONAL ASSESSMENT: PAIN_FUNCTIONAL_ASSESSMENT: 0-10

## 2019-06-20 NOTE — ANESTHESIA PRE PROCEDURE
Department of Anesthesiology  Preprocedure Note       Name:  Liu Brown   Age:  66 y.o.  :  1955                                          MRN:  [id number]         Date:  2019      Surgeon: Kena Arriaza):  Brooks Real MD    Procedure: SEPTOPLASTY, MICRODEBRIDER ASSISTED TURBINOPLASTY AND OUT-FRACTURING BALLOON SINUPLASTY OF RIGHT MAXILLARY, BILATERAL SPENOID SINUS, RESECTION OF LEFT KATIE BULLOSA, RIGHT ANTERIOR ETHMOIDECTOMY (N/A )    Medications prior to admission:   Prior to Admission medications    Medication Sig Start Date End Date Taking? Authorizing Provider   ranitidine (ZANTAC) 150 MG tablet Take 150 mg by mouth 14   Historical Provider, MD   acetaminophen (TYLENOL) 325 MG tablet Take 325-650 mg by mouth 14   Historical Provider, MD   OMEPRAZOLE PO Take 20 mg by mouth daily    Historical Provider, MD   Probiotic Product (ALIGN PO) Take by mouth daily    Historical Provider, MD   Calcium Polycarbophil (FIBER-CAPS PO) Take 1 capsule by mouth daily    Historical Provider, MD   Multiple Vitamins-Minerals (THERAPEUTIC MULTIVITAMIN-MINERALS) tablet Take 1 tablet by mouth daily    Historical Provider, MD   LUTEIN-ZEAXANTHIN PO Take 1 tablet by mouth daily    Historical Provider, MD   vitamin E 400 UNIT capsule Take 400 Units by mouth daily    Historical Provider, MD   GLUCOSAMINE-CHONDROITIN DS PO Take 1 tablet by mouth daily    Historical Provider, MD   Calcium Carb-Cholecalciferol (CALCIUM-VITAMIN D) 500-200 MG-UNIT per tablet Take 1 tablet by mouth daily    Historical Provider, MD   Omega 3 1200 MG CAPS Take 1 capsule by mouth daily    Historical Provider, MD   ipratropium (ATROVENT) 0.03 % nasal spray 2 sprays by Each Nostril route daily    Historical Provider, MD       Current medications:    No current facility-administered medications for this encounter.       Current Outpatient Medications   Medication Sig Dispense Refill    ranitidine (ZANTAC) 150 MG tablet Take 150 mg by mouth      acetaminophen (TYLENOL) 325 MG tablet Take 325-650 mg by mouth      OMEPRAZOLE PO Take 20 mg by mouth daily      Probiotic Product (ALIGN PO) Take by mouth daily      Calcium Polycarbophil (FIBER-CAPS PO) Take 1 capsule by mouth daily      Multiple Vitamins-Minerals (THERAPEUTIC MULTIVITAMIN-MINERALS) tablet Take 1 tablet by mouth daily      LUTEIN-ZEAXANTHIN PO Take 1 tablet by mouth daily      vitamin E 400 UNIT capsule Take 400 Units by mouth daily      GLUCOSAMINE-CHONDROITIN DS PO Take 1 tablet by mouth daily      Calcium Carb-Cholecalciferol (CALCIUM-VITAMIN D) 500-200 MG-UNIT per tablet Take 1 tablet by mouth daily      Omega 3 1200 MG CAPS Take 1 capsule by mouth daily      ipratropium (ATROVENT) 0.03 % nasal spray 2 sprays by Each Nostril route daily         Allergies: Allergies   Allergen Reactions    Aspirin Hives    Augmentin [Amoxicillin-Pot Clavulanate] Hives and Other (See Comments)     Heart race, anxiety    Codeine Hives    Pseudoephedrine Hives     Other reaction(s):  Other: See Comments  Heart races       Problem List:    Patient Active Problem List   Diagnosis Code    Deviated nasal septum J34.2    Hypertrophy of nasal turbinates J34.3    Chronic maxillary sinusitis J32.0    Chronic sphenoidal sinusitis J32.3    Unspecified disorder of nose and nasal sinuses J34.9    Chronic ethmoidal sinusitis J32.2    History of ethmoidectomy Z98.890       Past Medical History:        Diagnosis Date    Arthritis     PONV (postoperative nausea and vomiting)     nausea and vomiting after all six surgeries       Past Surgical History:        Procedure Laterality Date    BUNIONECTOMY Left 2018    CHOLECYSTECTOMY  2004    COLONOSCOPY  2019    ENDOSCOPY, COLON, DIAGNOSTIC  2019    EYE SURGERY Right 2011    cataracts    HYSTERECTOMY  2004    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 Anesthesia Plan      general     ASA 2       Induction: intravenous. MIPS: Postoperative opioids intended and Prophylactic antiemetics administered. Anesthetic plan and risks discussed with patient. Plan discussed with CRNA.     Attending anesthesiologist reviewed and agrees with Gloria Ang MD   6/20/2019

## 2019-06-20 NOTE — BRIEF OP NOTE
Brief Postoperative Note  ______________________________________________________________    Patient: Carl Schroeder  YOB: 1955  MRN: [id number]  Date of Procedure: 6/20/2019    Pre-Op Diagnosis: DEVIATED NASAL SEPTUM, HYPERTROPHY OF NASAL TURBINATES, CHRONIC MAXILLARY SINUSITIS, CHRONIC SPHENOIDAL SINUSITIS, UNSPECIFIED DISORDER OF NOSE AND NASAL SINUSES CHRONIC ETHMOID, RIGHT ANTERIOR ETHMOIDECTOMY    Post-Op Diagnosis: Same       Procedure(s):  SEPTOPLASTY, MICRODEBRIDER ASSISTED TURBINOPLASTY AND OUT-FRACTURING BALLOON SINUPLASTY OF LEFT AND RIGHT MAXILLARY, BILATERAL SPENOID SINUS, RESECTION OF LEFT KATIE BULLOSA, RIGHT ANTERIOR ETHMOIDECTOMY    Anesthesia: General    Surgeon(s):  Anjana Brandt MD    Assistant: General Court    Estimated Blood Loss (mL): 728     Complications: Bleeding 493RI    Specimens:   ID Type Source Tests Collected by Time Destination   A : reynaldo cartilagenous nasal speptum  Tissue Nose SURGICAL PATHOLOGY Anjana Brandt MD 6/20/2019 1329    B : right anterior ethmoid Tissue Nose SURGICAL PATHOLOGY Anjana Brandt MD 6/20/2019 1415    C : left katie bullosa Tissue Nose SURGICAL PATHOLOGY Anjana Brandt MD 6/20/2019 1425        Implants:  * No implants in log *      Drains: * No LDAs found *    Findings: Moderate septal deviation; hypertrophic turbinates; maxillary, sphenoid and ethmoid sinusitis.            Large katie Bullosa left    Anjana Brandt MD  Date: 6/20/2019  Time: 2:43 PM

## 2019-06-20 NOTE — PROGRESS NOTES
1600:  Patient up to the bathroom to void. Activity tolerated well. Patient states that her nausea is better.   Patient was medicated with one percocet for pain at 1545 PM.

## 2019-06-20 NOTE — PROGRESS NOTES
Pharmacy sent Ancef through tube system. I spoke with Goldy Shabazz (pharmacist). He said that it should be fine to give with Augmentin reaction. Patient relayed that she thinks that she has taken penicillin before without a reaction.

## 2019-06-21 NOTE — OP NOTE
Evelyn Metz [address]                      [address], [address] [facility]                                OPERATIVE REPORT    PATIENT NAME: Davy Smith                  :        1955  MED REC NO:   [id number]                            ROOM:  ACCOUNT NO:   [de-identified]                           ADMIT DATE: 2019  PROVIDER:     Caren Huerta MD    DATE OF PROCEDURE:  2019    PREOPERATIVE DIAGNOSES:  Moderately severe deviated nasal septum,  hypertrophic turbinates, opacification of the sinuses, which include the  maxillary, sphenoid and ethmoid sinuses. The frontal sinus was spared. The patient has a longstanding history of _____ facial pain and  discomfort. However, she denied having a trauma to her nose, but  claimed that she did have accident remotely to the nasal complex. Her  main complaint was pain in the cheek, the glabellar area and the nasal  bridge area associated with difficulty in nasal breathing and loud  snoring.  claimed that there was a questionable apnea while she  is asleep, but main thing was her breathing when she is awake and with  constant drainage in both nasal chambers. A radiologic evaluation of  the paranasal sinuses showed opacification and thickening of the walls  of the maxillary sinus and the sphenoid sinus on both sides as well as  the ethmoid sinuses. It was therefore decided that she undergo the  aforementioned surgical intervention. 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 OPERATIVE PROCEDURE:  The patient was placed in supine position and  general endotracheal intubation anesthesia was satisfactorily inducted. The patient was draped in usual fashion. Initially strips of cottonoids  soaked in Emilio-Synephrine 1% were inserted to both nasal chambers and  after sometime they were retrieved.   Xylocaine 1% with 1:200,000  epinephrine was injected under the mucoperichondrium on both sides. A  left hemitransfixion incision was made and the mucoperichondrium in the  left side was elevated up to the posterior aspect. Few millimeters from  this incision, the quadrilateral cartilage was incised and the  mucoperichondrium in the opposite side was elevated in the same manner. With this maneuver, we were able to free the bony cartilaginous septum  from the overlying mucoperichondrial attachment. The quadrilateral  cartilage at this point was removed with the use of a swivel knife and  the inferior margin, which was dislocated from the maxillary crest was  dissected with the use of a Keily dissector and this was retrieved with  the use of a Dayday forceps. There was moderate amount of septal  spur and the mucosa adhering to the spur was then elevated using a  Keily dissector and this was removed with the use of a 4-mm chisel. With this maneuver, we were able to realign the septum in the midline. The surgical wound was approximated utilizing a 4-0 chromic suture  material and the two leaves of the mucoperichondrium were stapled in the  midline with the use of an Entrigue absorbable staples. Then we  directed our attention in doing the microdebrider assisted turbinoplasty  by first injecting the inferior turbinates with the use of Xylocaine 1%  with 1:200,000 epinephrine and using a Scivantage microdebrider, the  spatula tip microdebrider was inserted at the anterior attachment of the  inferior turbinates and directed posteriorly along the intramural aspect  of the turbinates and through a rotatory motion, the submucosa was  removed and this in fact reduced the size of the inferior turbinates. The diameter of the nasal cavity was further enhanced by outfracturing  the inferior turbinates. And this was also done on the opposite side.    Then we directed our attention in doing the balloon sinuplasty in both  maxillary sinuses by first utilizing a #10 degree Relieva balloon guide  and this was directed into the ostium with the aid of the C-arm machine. We were able to thread the wire into the antrum and the balloon was  inserted and noting the two markers of the balloon straddling over these  ostium, this was inflated with the guide of the C-arm up to 12 mmH2O. Pictures were obtained and after sometime the wire was removed and the  balloon was inserted further into the antrum and the maxillary cavity  was irrigated with the use a of an Ancef solution. The same procedure  was performed on the opposite side. Then we directed our attention in  doing the balloon sinuplasty of the sphenoid by first identifying the  middle turbinate and this was lateralized and 0-degrees balloon guide  was then inserted and the wire was threaded through the opening and this  was guided with the use of the C-arm machine then the balloon was  inserted and noting that it was straddling over the ostium of the  sphenoid sinus, this was also inflated up to 12 mmH2O and after sometime  the wire was removed and this was irrigated with the use of Ancef  solution. The same procedure was performed on the opposite side. The  intranasal ethmoidectomy was then done by removing the anterior group of  cells of the ethmoid and with the use of a cup forceps and a cochlear  curette. The left keesha bullosa was noted and this was resected using  an angled scissor and this was removed in toto. There was minimal  bleeding that ensued and packing using a quarter-inch Iodoform gauze was  done. Finally, this was removed and a Saha intranasal splint was  inserted and this was supplemented with a quarter-inch Iodoform gauze  packing. The patient tolerated the procedure well and she was wheeled  to the postanesthesia care unit in satisfactory condition.         Suzie Mcgovern MD    D: 06/20/2019 22:45:28       T: 06/20/2019 22:53:27     [email or web address]  Job#: [id number]     Doc#: [id number]    CC:  Dahlia Man

## 2021-07-29 RX ORDER — IPRATROPIUM BROMIDE 21 UG/1
2 SPRAY, METERED NASAL DAILY
Qty: 1 BOTTLE | Refills: 3 | Status: SHIPPED | OUTPATIENT
Start: 2021-07-29

## 2023-07-25 ENCOUNTER — HOSPITAL ENCOUNTER (OUTPATIENT)
Dept: DATA CONVERSION | Facility: HOSPITAL | Age: 68
End: 2023-07-25
Attending: INTERNAL MEDICINE | Admitting: INTERNAL MEDICINE
Payer: MEDICARE

## 2023-07-25 DIAGNOSIS — I48.91 UNSPECIFIED ATRIAL FIBRILLATION (MULTI): ICD-10-CM

## 2023-07-25 DIAGNOSIS — R00.2 PALPITATIONS: ICD-10-CM

## 2023-07-26 LAB
ATRIAL RATE: 53 BPM
P AXIS: 50 DEGREES
P OFFSET: 199 MS
P ONSET: 136 MS
PR INTERVAL: 186 MS
Q ONSET: 229 MS
QRS COUNT: 8 BEATS
QRS DURATION: 86 MS
QT INTERVAL: 464 MS
QTC CALCULATION(BAZETT): 435 MS
QTC FREDERICIA: 445 MS
R AXIS: 21 DEGREES
T AXIS: 16 DEGREES
T OFFSET: 461 MS
VENTRICULAR RATE: 53 BPM

## 2023-09-29 VITALS — HEIGHT: 64 IN | WEIGHT: 145.28 LBS | BODY MASS INDEX: 24.8 KG/M2

## 2023-09-30 NOTE — H&P
History & Physical Reviewed:   I have reviewed the History and Physical dated:  28-Jun-2023   History and Physical reviewed and relevant findings noted. Patient examined to review pertinent physical  findings.: No significant changes   Home Medications Reviewed: no changes noted   Allergies Reviewed: no changes noted       Airway/Sedation Assessment:  ·  Mouth Opening OK yes   ·  Neck Flexibility OK yes   ·  Loose Teeth no   ·  Oropharyngeal Classification Class II       ERAS (Enhanced Recovery After Surgery):  ·  ERAS Patient: no     Consent:   COVID-19 Consent:  ·  COVID-19 Risk Consent Surgeon has reviewed key risks related to the risk of yuriy COVID-19 and if they contract COVID-19 what the risks are.       Electronic Signatures:  Dilan Le)  (Signed 25-Jul-2023 10:49)   Authored: History & Physical Reviewed, Airway/Sedation,  ERAS, Consent, Note Completion      Last Updated: 25-Jul-2023 10:49 by Dilan Le)

## 2023-10-05 ENCOUNTER — HOSPITAL ENCOUNTER (OUTPATIENT)
Dept: CARDIOLOGY | Facility: HOSPITAL | Age: 68
Discharge: HOME | End: 2023-10-05
Payer: MEDICARE

## 2023-10-05 DIAGNOSIS — Z95.818 PRESENCE OF OTHER CARDIAC IMPLANTS AND GRAFTS: ICD-10-CM

## 2023-10-05 DIAGNOSIS — I48.92 ATRIAL FLUTTER, UNSPECIFIED TYPE (MULTI): ICD-10-CM

## 2023-10-05 PROCEDURE — 93298 REM INTERROG DEV EVAL SCRMS: CPT | Performed by: INTERNAL MEDICINE

## 2023-11-07 ENCOUNTER — OFFICE VISIT (OUTPATIENT)
Dept: ENDOCRINOLOGY | Age: 68
End: 2023-11-07
Payer: MEDICARE

## 2023-11-07 VITALS
BODY MASS INDEX: 26.75 KG/M2 | SYSTOLIC BLOOD PRESSURE: 110 MMHG | DIASTOLIC BLOOD PRESSURE: 67 MMHG | HEART RATE: 56 BPM | HEIGHT: 63 IN | WEIGHT: 151 LBS | OXYGEN SATURATION: 95 %

## 2023-11-07 DIAGNOSIS — E05.20 GOITER, TOXIC, MULTINODULAR: ICD-10-CM

## 2023-11-07 DIAGNOSIS — E05.90 HYPERTHYROIDISM: ICD-10-CM

## 2023-11-07 LAB
T4 FREE SERPL-MCNC: 0.92 NG/DL (ref 0.84–1.68)
TSH SERPL-MCNC: 0.46 UIU/ML (ref 0.44–3.86)

## 2023-11-07 PROCEDURE — 1090F PRES/ABSN URINE INCON ASSESS: CPT | Performed by: INTERNAL MEDICINE

## 2023-11-07 PROCEDURE — 3017F COLORECTAL CA SCREEN DOC REV: CPT | Performed by: INTERNAL MEDICINE

## 2023-11-07 PROCEDURE — G8400 PT W/DXA NO RESULTS DOC: HCPCS | Performed by: INTERNAL MEDICINE

## 2023-11-07 PROCEDURE — G8419 CALC BMI OUT NRM PARAM NOF/U: HCPCS | Performed by: INTERNAL MEDICINE

## 2023-11-07 PROCEDURE — 99203 OFFICE O/P NEW LOW 30 MIN: CPT | Performed by: INTERNAL MEDICINE

## 2023-11-07 PROCEDURE — G8427 DOCREV CUR MEDS BY ELIG CLIN: HCPCS | Performed by: INTERNAL MEDICINE

## 2023-11-07 PROCEDURE — 1123F ACP DISCUSS/DSCN MKR DOCD: CPT | Performed by: INTERNAL MEDICINE

## 2023-11-07 PROCEDURE — G8484 FLU IMMUNIZE NO ADMIN: HCPCS | Performed by: INTERNAL MEDICINE

## 2023-11-07 PROCEDURE — 1036F TOBACCO NON-USER: CPT | Performed by: INTERNAL MEDICINE

## 2023-11-07 RX ORDER — DILTIAZEM HYDROCHLORIDE 180 MG/1
180 CAPSULE, COATED, EXTENDED RELEASE ORAL DAILY
COMMUNITY
Start: 2023-04-26

## 2023-11-07 RX ORDER — CHLORAL HYDRATE 500 MG
1000 CAPSULE ORAL
COMMUNITY
Start: 2019-03-18

## 2023-11-07 RX ORDER — AMLODIPINE BESYLATE 2.5 MG/1
2.5 TABLET ORAL
COMMUNITY
Start: 2023-02-10

## 2023-11-07 RX ORDER — PANCRELIPASE 24000; 76000; 120000 [USP'U]/1; [USP'U]/1; [USP'U]/1
CAPSULE, DELAYED RELEASE PELLETS ORAL
COMMUNITY

## 2023-11-07 RX ORDER — DENOSUMAB 60 MG/ML
60 INJECTION SUBCUTANEOUS
COMMUNITY
Start: 2022-09-01

## 2023-11-07 RX ORDER — LANOLIN ALCOHOL/MO/W.PET/CERES
1 CREAM (GRAM) TOPICAL DAILY
COMMUNITY
Start: 2023-05-12

## 2023-11-07 RX ORDER — METHIMAZOLE 5 MG/1
TABLET ORAL
COMMUNITY
Start: 2023-08-13

## 2023-11-07 RX ORDER — FLECAINIDE ACETATE 150 MG/1
75 TABLET ORAL
COMMUNITY
Start: 2023-07-19

## 2023-11-07 RX ORDER — ISOSORBIDE MONONITRATE 30 MG/1
30 TABLET, EXTENDED RELEASE ORAL 2 TIMES DAILY
COMMUNITY
Start: 2023-11-03

## 2023-11-07 RX ORDER — OCTISALATE, AVOBENZONE, HOMOSALATE, AND OCTOCRYLENE 29.4; 29.4; 49; 25.48 MG/ML; MG/ML; MG/ML; MG/ML
LOTION TOPICAL
COMMUNITY

## 2023-11-07 RX ORDER — LATANOPROST 50 UG/ML
SOLUTION/ DROPS OPHTHALMIC
COMMUNITY
Start: 2023-11-03

## 2023-11-07 ASSESSMENT — ENCOUNTER SYMPTOMS
EYES NEGATIVE: 1
TROUBLE SWALLOWING: 1

## 2023-11-07 NOTE — PROGRESS NOTES
11/7/2023    Assessment:       Diagnosis Orders   1. Hyperthyroidism        2. Goiter, toxic, multinodular              PLAN:     Continue current dose of Tapazole patient to have repeat thyroid function test thyroid antibodies and also thyroid ultrasound further management depend upon the results of the labs more than 50% of 30 minutes spent in patient education and counseling  Orders Placed This Encounter   Procedures    US HEAD NECK SOFT TISSUE THYROID     This procedure can be scheduled via [facility]. Access your [facility] account by visiting [email or web address]. Standing Status:   Future     Standing Expiration Date:   11/7/2024    T4, Free     Standing Status:   Future     Number of Occurrences:   1     Standing Expiration Date:   11/7/2024    TSH     Standing Status:   Future     Number of Occurrences:   1     Standing Expiration Date:   11/7/2024    Thyroid Stimulating Immunoglobulin     Standing Status:   Future     Number of Occurrences:   1     Standing Expiration Date:   11/7/2024         Subjective:     Chief Complaint   Patient presents with    New Patient     Thyrotoxicosis, unspecified without thyrotoxic crisis or storm    Goiter     Nontoxic multinodular goiter     Vitals:    11/07/23 1113   BP: 110/67   Pulse: 56   SpO2: 95%   Weight: 68.5 kg (151 lb)   Height: 1.6 m (5' 3\")     Wt Readings from Last 3 Encounters:   11/07/23 68.5 kg (151 lb)   06/20/19 60 kg (132 lb 3.2 oz)   06/12/19 60 kg (132 lb 3.2 oz)     BP Readings from Last 3 Encounters:   11/07/23 110/67   06/20/19 (!) 146/80   06/20/19 (!) 154/77     Patient referred here for hyper thyroidism on low-dose of Tapazole 5 mg 3 days a week thyroid function tests were checked from 2 months ago was normal patient does have history of goiter with nodules on the right side status post biopsies which was benign does complain of dysphagia but does have a hiatal hernia and has had esophageal dilations    Thyroid Problem  Presents for initial visit.

## 2023-11-08 LAB — TSI SER-ACNC: <0.1 IU/L

## 2023-11-10 ENCOUNTER — HOSPITAL ENCOUNTER (OUTPATIENT)
Dept: CARDIOLOGY | Facility: HOSPITAL | Age: 68
Discharge: HOME | End: 2023-11-10
Payer: MEDICARE

## 2023-11-10 DIAGNOSIS — I48.92 ATRIAL FLUTTER, UNSPECIFIED TYPE (MULTI): ICD-10-CM

## 2023-11-10 DIAGNOSIS — Z95.818 PRESENCE OF OTHER CARDIAC IMPLANTS AND GRAFTS: ICD-10-CM

## 2023-11-10 DIAGNOSIS — Z95.818 PRESENCE OF OTHER CARDIAC IMPLANTS AND GRAFTS: Primary | ICD-10-CM

## 2023-11-10 PROCEDURE — 93298 REM INTERROG DEV EVAL SCRMS: CPT | Performed by: INTERNAL MEDICINE

## 2023-11-17 DIAGNOSIS — I48.92 ATRIAL FLUTTER, UNSPECIFIED TYPE (MULTI): ICD-10-CM

## 2023-11-24 PROBLEM — H90.3 ASYMMETRIC SNHL (SENSORINEURAL HEARING LOSS): Status: ACTIVE | Noted: 2023-01-05

## 2023-11-24 PROBLEM — E04.2 MULTIPLE THYROID NODULES: Status: ACTIVE | Noted: 2023-11-24

## 2023-11-24 PROBLEM — K22.2 SCHATZKI'S RING: Status: ACTIVE | Noted: 2023-11-24

## 2023-11-24 PROBLEM — E66.3 OVER WEIGHT: Status: ACTIVE | Noted: 2023-11-24

## 2023-11-24 PROBLEM — K80.20 CHOLELITHIASIS: Status: ACTIVE | Noted: 2023-11-24

## 2023-11-24 PROBLEM — K44.9 HIATAL HERNIA: Status: ACTIVE | Noted: 2023-11-24

## 2023-11-24 PROBLEM — J34.3 HYPERTROPHY OF NASAL TURBINATES: Status: ACTIVE | Noted: 2019-06-12

## 2023-11-24 PROBLEM — R79.0 LOW FERRITIN: Status: ACTIVE | Noted: 2023-11-24

## 2023-11-24 PROBLEM — K86.89 PANCREATIC INSUFFICIENCY (HHS-HCC): Status: ACTIVE | Noted: 2023-11-24

## 2023-11-24 PROBLEM — J34.2 DEVIATED NASAL SEPTUM: Status: ACTIVE | Noted: 2019-06-12

## 2023-11-24 PROBLEM — J32.3 CHRONIC SPHENOIDAL SINUSITIS: Status: ACTIVE | Noted: 2019-06-12

## 2023-11-24 PROBLEM — E05.90 HYPERTHYROIDISM: Status: ACTIVE | Noted: 2023-11-24

## 2023-11-24 PROBLEM — K21.9 ACID REFLUX: Status: ACTIVE | Noted: 2023-11-24

## 2023-11-24 PROBLEM — E78.5 HLD (HYPERLIPIDEMIA): Status: ACTIVE | Noted: 2023-11-24

## 2023-11-24 PROBLEM — Z86.010 HISTORY OF COLON POLYPS: Status: ACTIVE | Noted: 2023-11-24

## 2023-11-24 PROBLEM — K59.04 CHRONIC IDIOPATHIC CONSTIPATION: Status: ACTIVE | Noted: 2023-11-24

## 2023-11-24 PROBLEM — J32.0 CHRONIC MAXILLARY SINUSITIS: Status: ACTIVE | Noted: 2019-06-12

## 2023-11-24 PROBLEM — G43.909 MIGRAINE: Status: ACTIVE | Noted: 2023-11-24

## 2023-11-24 PROBLEM — M19.90 ARTHRITIS: Status: ACTIVE | Noted: 2023-11-24

## 2023-11-24 PROBLEM — H26.9 CATARACTS, BILATERAL: Status: ACTIVE | Noted: 2023-11-24

## 2023-11-24 PROBLEM — J32.2 CHRONIC ETHMOIDAL SINUSITIS: Status: ACTIVE | Noted: 2019-06-12

## 2023-11-24 PROBLEM — J30.9 ALLERGIC RHINITIS: Status: ACTIVE | Noted: 2023-01-05

## 2023-11-24 PROBLEM — Z86.79 HISTORY OF HYPERTENSION: Status: ACTIVE | Noted: 2023-11-24

## 2023-11-24 PROBLEM — M85.80 OSTEOPENIA: Status: ACTIVE | Noted: 2023-11-24

## 2023-11-24 PROBLEM — K64.9 HEMORRHOIDS: Status: ACTIVE | Noted: 2023-11-24

## 2023-11-24 PROBLEM — Z86.0100 HISTORY OF COLON POLYPS: Status: ACTIVE | Noted: 2023-11-24

## 2023-11-24 PROBLEM — H93.19 TINNITUS: Status: ACTIVE | Noted: 2023-01-05

## 2023-11-24 PROBLEM — M21.961 RIGHT ANKLE JOINT DEFORMITY: Status: ACTIVE | Noted: 2023-11-24

## 2023-11-24 PROBLEM — Z98.890: Status: ACTIVE | Noted: 2019-06-12

## 2023-11-24 PROBLEM — I48.0 PAROXYSMAL ATRIAL FIBRILLATION (MULTI): Status: ACTIVE | Noted: 2023-11-24

## 2023-11-24 RX ORDER — FAMOTIDINE 20 MG/1
20 TABLET, FILM COATED ORAL DAILY
COMMUNITY
Start: 2023-03-21 | End: 2024-05-29

## 2023-11-24 RX ORDER — METHIMAZOLE 5 MG/1
TABLET ORAL
COMMUNITY

## 2023-11-24 RX ORDER — LATANOPROST 50 UG/ML
1 SOLUTION/ DROPS OPHTHALMIC NIGHTLY
COMMUNITY
Start: 2022-12-19

## 2023-11-24 RX ORDER — METOPROLOL TARTRATE 25 MG/1
50 TABLET, FILM COATED ORAL 2 TIMES DAILY
COMMUNITY
Start: 2023-05-25 | End: 2023-11-29 | Stop reason: SDUPTHER

## 2023-11-24 RX ORDER — ACETAMINOPHEN 500 MG
10000 TABLET ORAL
COMMUNITY

## 2023-11-24 RX ORDER — ALUMINUM ZIRCONIUM OCTACHLOROHYDREX GLY 16 G/100G
1 GEL TOPICAL DAILY
COMMUNITY
Start: 2019-09-16

## 2023-11-24 RX ORDER — FLECAINIDE ACETATE 150 MG/1
75 TABLET ORAL 2 TIMES DAILY
COMMUNITY
Start: 2023-07-19 | End: 2023-11-29 | Stop reason: ALTCHOICE

## 2023-11-24 RX ORDER — ATORVASTATIN CALCIUM 20 MG/1
20 TABLET, FILM COATED ORAL DAILY
COMMUNITY
Start: 2022-12-23

## 2023-11-24 RX ORDER — EVENING PRIMROSE OIL 500 MG
CAPSULE ORAL
COMMUNITY

## 2023-11-24 RX ORDER — LUTEIN 10 MG
20 TABLET ORAL
COMMUNITY

## 2023-11-24 RX ORDER — FERROUS SULFATE, DRIED 160(50) MG
1 TABLET, EXTENDED RELEASE ORAL DAILY
COMMUNITY

## 2023-11-24 RX ORDER — OMEPRAZOLE 20 MG/1
20 CAPSULE, DELAYED RELEASE ORAL DAILY
COMMUNITY

## 2023-11-24 RX ORDER — PANCRELIPASE 24000; 76000; 120000 [USP'U]/1; [USP'U]/1; [USP'U]/1
CAPSULE, DELAYED RELEASE PELLETS ORAL
COMMUNITY

## 2023-11-24 RX ORDER — FLECAINIDE ACETATE 150 MG/1
TABLET ORAL 2 TIMES DAILY
Qty: 90 TABLET | Refills: 1 | Status: SHIPPED | OUTPATIENT
Start: 2023-11-24 | End: 2024-05-24

## 2023-11-24 RX ORDER — GLUC/MSM/COLGN2/HYAL/ANTIARTH3 375-375-20
1 TABLET ORAL 3 TIMES DAILY
COMMUNITY
End: 2023-11-29 | Stop reason: ALTCHOICE

## 2023-11-24 RX ORDER — FERROUS SULFATE 325(65) MG
1 TABLET, DELAYED RELEASE (ENTERIC COATED) ORAL DAILY
COMMUNITY
Start: 2023-05-12

## 2023-11-24 RX ORDER — FLUTICASONE PROPIONATE 50 MCG
1 SPRAY, SUSPENSION (ML) NASAL
COMMUNITY

## 2023-11-29 ENCOUNTER — HOSPITAL ENCOUNTER (OUTPATIENT)
Dept: CARDIOLOGY | Facility: HOSPITAL | Age: 68
Discharge: HOME | End: 2023-11-29
Payer: MEDICARE

## 2023-11-29 ENCOUNTER — OFFICE VISIT (OUTPATIENT)
Dept: CARDIOLOGY | Facility: CLINIC | Age: 68
End: 2023-11-29
Payer: MEDICARE

## 2023-11-29 VITALS
WEIGHT: 150 LBS | HEART RATE: 56 BPM | DIASTOLIC BLOOD PRESSURE: 72 MMHG | HEIGHT: 64 IN | BODY MASS INDEX: 25.61 KG/M2 | SYSTOLIC BLOOD PRESSURE: 120 MMHG

## 2023-11-29 DIAGNOSIS — Z51.81 ANTICOAGULATION MANAGEMENT ENCOUNTER: ICD-10-CM

## 2023-11-29 DIAGNOSIS — R94.31 ABNORMAL EKG: ICD-10-CM

## 2023-11-29 DIAGNOSIS — I48.92 ATRIAL FLUTTER, UNSPECIFIED TYPE (MULTI): ICD-10-CM

## 2023-11-29 DIAGNOSIS — Z95.818 STATUS POST PLACEMENT OF IMPLANTABLE LOOP RECORDER: Primary | ICD-10-CM

## 2023-11-29 DIAGNOSIS — I48.11 LONGSTANDING PERSISTENT ATRIAL FIBRILLATION (MULTI): ICD-10-CM

## 2023-11-29 DIAGNOSIS — R00.2 PALPITATIONS: ICD-10-CM

## 2023-11-29 DIAGNOSIS — Z87.891 FORMER SMOKER: ICD-10-CM

## 2023-11-29 DIAGNOSIS — Z95.818 PRESENCE OF OTHER CARDIAC IMPLANTS AND GRAFTS: ICD-10-CM

## 2023-11-29 DIAGNOSIS — Z79.01 ANTICOAGULATION MANAGEMENT ENCOUNTER: ICD-10-CM

## 2023-11-29 PROCEDURE — 3008F BODY MASS INDEX DOCD: CPT | Performed by: INTERNAL MEDICINE

## 2023-11-29 PROCEDURE — 93291 INTERROG DEV EVAL SCRMS IP: CPT | Performed by: INTERNAL MEDICINE

## 2023-11-29 PROCEDURE — 99214 OFFICE O/P EST MOD 30 MIN: CPT | Performed by: INTERNAL MEDICINE

## 2023-11-29 PROCEDURE — 93000 ELECTROCARDIOGRAM COMPLETE: CPT | Performed by: INTERNAL MEDICINE

## 2023-11-29 PROCEDURE — 1159F MED LIST DOCD IN RCRD: CPT | Performed by: INTERNAL MEDICINE

## 2023-11-29 PROCEDURE — 93291 INTERROG DEV EVAL SCRMS IP: CPT

## 2023-11-29 RX ORDER — ISOSORBIDE MONONITRATE 30 MG/1
1 TABLET, EXTENDED RELEASE ORAL 2 TIMES DAILY
COMMUNITY
Start: 2022-11-15

## 2023-11-29 RX ORDER — AZELASTINE 1 MG/ML
2 SPRAY, METERED NASAL 2 TIMES DAILY
COMMUNITY
Start: 2023-11-17

## 2023-11-29 RX ORDER — METOPROLOL TARTRATE 25 MG/1
25 TABLET, FILM COATED ORAL 2 TIMES DAILY
Qty: 90 TABLET | Refills: 3 | Status: SHIPPED | OUTPATIENT
Start: 2023-11-29

## 2023-11-29 NOTE — PATIENT INSTRUCTIONS
Continue same medications/treatment.  Patient educated on proper medication use.  Patient educated on risk factor modification.  Please bring any lab results from other providers/physicians to your next appointment.    Please bring all medicines, vitamins, and herbal supplements with you when you come to the office.    Prescriptions will not be filled unless you are compliant with your follow up appointments or have a follow up appointment scheduled as per instruction of your physician. Refills should be requested at the time of your visit.    Follow up with Dr. Le in 6 months  If not feeling any better, call in Dec.

## 2023-11-29 NOTE — PROGRESS NOTES
CARDIOLOGY OFFICE VISIT      CHIEF COMPLAINT  Chief Complaint   Patient presents with    Device Check       HISTORY OF PRESENT ILLNESS  HPI    68-year-old female with a past medical history of hypertension and hyperthyroidism on methimazole therapy followed by endocrinology service. Patient states that for the last 2 to 3 years she has been noticing palpitations on and off but for the last 6 months they are becoming more frequent. Patient was having emergency department visits for palpitations at some point last year. She had some EKG changes and she underwent an a stress test. During the stress that she developed angina with ST depressions. She underwent a cardiac catheterization that showed normal coronary arteries but evidence of spasm in the LAD and diagonal. Since then she was placed on isosorbide therapy. She continues having fluttering. She had an event monitor ordered in March 2023. Event monitor showed frequent episodes of atrial fibrillation with rapid ventricular response. Main rhythm was sinus rhythm. She was placed on Lopressor therapy but apparently she noticed her palpitations more more frequent and she discontinued this medication. She was placed on calcium channel blocker but also she had the same sensation of palpitations increasing with this medication and she decided to stop.  Patient was placed on beta-blocker therapy. Echocardiogram in 2020 shows normal left ventricular function value 55%.     During the last office visit, she was placed on flecainide therapy due to persistent atrial fibrillation.  She states that since then she has been doing well.  She has not noticed any more palpitations but she is feeling a bit more tired and fatigued doing her daily activities.    EKG performed today shows sinus bradycardia rate of 56 bpm QRS ration 96 ms QT corrected 397 ms.  Rhythm strip shows the same pattern.    Laboratory in July 2023 shows creatinine 0.7.  Potassium 4.3.  Left is okay.    She also  underwent loop recorder implantation in July 2023.  Loop recorder so far has not seen any episodes of atrial fibrillation.  No significant pauses.        Past Medical History  History reviewed. No pertinent past medical history.    Social History  Social History     Tobacco Use    Smoking status: Former     Types: Cigarettes    Smokeless tobacco: Not on file   Substance Use Topics    Alcohol use: Not Currently    Drug use: Not Currently       Family History     Family History   Problem Relation Name Age of Onset    Other (cerebrovascular accident) Mother      Heart attack Father      Febrile seizures Other          Allergies:  Allergies   Allergen Reactions    Amoxicillin Shortness of breath, Hives and Other     Other Reaction(s): hives,    Other reaction(s): hives,   Other Reaction(s): hives,    Aspirin GI intolerance, GI Upset, Hives, Other and Unknown    Cat Dander Unknown    Codeine Other, Hives and Unknown     Other Reaction(s): Elevated heart rate with elevated blood pressure and diagnosis of hypertension    Hydrocodone-Acetaminophen Other     Other Reaction(s): AOF    Pseudoephedrine Hives, Other, Palpitations and Unknown     Other Reaction(s): Tachycardia      Other reaction(s): Other: See Comments Heart races      Heart races    Heart races    Other reaction(s): Other: See Comments   Heart races        Outpatient Medications:  Current Outpatient Medications   Medication Instructions    apixaban (Eliquis) 5 mg tablet 1 tablet, oral, 2 times daily    ascorbic acid, vitamin C, 100 mg chewable tablet 1 tablet, Daily    atorvastatin (LIPITOR) 20 mg, oral, Daily    azelastine (Astelin) 137 mcg (0.1 %) nasal spray 2 sprays, Each Nostril, 2 times daily    Bifidobacterium infantis (Align) 4 mg capsule 1 capsule, Daily    calcium carbonate-vitamin D3 500 mg-5 mcg (200 unit) tablet 1 tablet, oral, Daily    cholecalciferol (VITAMIN D-3) 10,000 Units, oral, Daily RT    Creon 24,000-76,000 -120,000 unit capsule TAKE 1  CAPSULE BY MOUTH 3 TIMES A DAY WITH MEAL AND SNACK    ferrous sulfate 325 (65 Fe) MG EC tablet 1 tablet, oral, Daily    FIBER, CALCIUM POLYCARBOPHIL, ORAL Gummy, Refills(s) 0    flecainide (Tambocor) 150 mg tablet oral, 2 times daily    fluticasone (Flonase) 50 mcg/actuation nasal spray 1 spray, nasal, Daily RT    isosorbide mononitrate ER (Imdur) 30 mg 24 hr tablet 1 tablet, oral, 2 times daily    latanoprost (Xalatan) 0.005 % ophthalmic solution 1 drop, Both Eyes, Nightly    lutein 20 mg, oral, Daily RT    methIMAzole (Tapazole) 5 mg tablet TAKE 1 TABLET BY MOUTH 3 TIMES A WEEK ON MONDAY WEDNESDAY AND FRIDAY    metoprolol tartrate (LOPRESSOR) 50 mg, oral, 2 times daily    multivitamin with minerals (multivitamin) tablet 1 tablet, oral, Daily RT    omeprazole (PRILOSEC) 20 mg, oral, Daily    Pepcid 20 mg, oral, Daily    vitamin E 45 mg (100 unit) capsule Vitamin E          REVIEW OF SYSTEMS  ROS      VITALS  Vitals:    11/29/23 1406   BP: 120/72   Pulse: 56       PHYSICAL EXAM  Constitutional:       Appearance: Normal and healthy appearance. Well-developed and not in distress.   Neck:      Vascular: No JVR. JVD normal.   Pulmonary:      Effort: Pulmonary effort is normal.      Breath sounds: Normal breath sounds. No wheezing. No rhonchi. No rales.   Chest:      Chest wall: Not tender to palpatation.      Comments: Left sided loop recorder  Cardiovascular:      PMI at left midclavicular line. Normal rate. Regular rhythm. Normal S1. Normal S2.       Murmurs: There is no murmur.      No gallop.  No click. No rub.   Pulses:     Intact distal pulses.   Edema:     Peripheral edema absent.   Abdominal:      Tenderness: There is no abdominal tenderness.   Musculoskeletal: Normal range of motion.         General: No tenderness. Skin:     General: Skin is warm and dry.   Neurological:      General: No focal deficit present.      Mental Status: Alert and oriented to person, place and time.   Psychiatric:         Behavior:  Behavior is cooperative.           ASSESSMENT AND PLAN      Clinical impression    1. Palpitations  2. Evidence of atrial fibrillation on event monitor with rapid ventricular response  3. Hyperthyroidism on treatment with methimazole  4. Normal coronary arteries with evidence of coronary spasm of the LAD and diagonal by cardiac catheterization in 2022  5. Normal left ventricular function per echocardiogram in 2020  6. Hypertension  7.  High risk medication (flecainide)  8.  Status post loop recorder implantation in July 2023 with no complications    Plan recommendations    From the electrophysiologist on point she is doing well.  Will continue with current medical therapy that includes high risk medication (flecainide).    Due to episodes of shortness of breath, we will cut the dose of Lopressor to 25 mg 1 tablet twice a day.    Follow device clinic as scheduled.    Continue with anticoagulation therapy.    Follow my office every 6 months or sooner needed.    Risk factor modification and lifestyle modification discussed with patient. Diet , exercise and hydration discussed with patient.    I have personally review with patient during this office visit, laboratory data, echocardiogram results, stress test results, Holter-event monitor results prior and after the last electrophysiology visit. All questions has been answered.    Please excuse any errors in grammar or translation related to this dictation.  Voice recognition software was utilized to prepare this document.

## 2023-12-01 ENCOUNTER — HOSPITAL ENCOUNTER (OUTPATIENT)
Dept: CARDIOLOGY | Facility: HOSPITAL | Age: 68
Discharge: HOME | End: 2023-12-01
Payer: MEDICARE

## 2023-12-01 DIAGNOSIS — Z95.818 STATUS POST PLACEMENT OF IMPLANTABLE LOOP RECORDER: ICD-10-CM

## 2023-12-08 ENCOUNTER — HOSPITAL ENCOUNTER (OUTPATIENT)
Dept: CARDIOLOGY | Facility: HOSPITAL | Age: 68
Discharge: HOME | End: 2023-12-08
Payer: MEDICARE

## 2023-12-08 DIAGNOSIS — Z95.818 STATUS POST PLACEMENT OF IMPLANTABLE LOOP RECORDER: ICD-10-CM

## 2023-12-15 ENCOUNTER — HOSPITAL ENCOUNTER (OUTPATIENT)
Dept: CARDIOLOGY | Facility: HOSPITAL | Age: 68
Discharge: HOME | End: 2023-12-15
Payer: MEDICARE

## 2023-12-15 DIAGNOSIS — Z95.818 STATUS POST PLACEMENT OF IMPLANTABLE LOOP RECORDER: ICD-10-CM

## 2024-01-05 ENCOUNTER — HOSPITAL ENCOUNTER (OUTPATIENT)
Dept: CARDIOLOGY | Facility: HOSPITAL | Age: 69
Discharge: HOME | End: 2024-01-05
Payer: COMMERCIAL

## 2024-01-05 DIAGNOSIS — Z95.818 STATUS POST PLACEMENT OF IMPLANTABLE LOOP RECORDER: ICD-10-CM

## 2024-01-05 PROCEDURE — 93298 REM INTERROG DEV EVAL SCRMS: CPT

## 2024-01-05 PROCEDURE — 93298 REM INTERROG DEV EVAL SCRMS: CPT | Performed by: INTERNAL MEDICINE

## 2024-01-12 ENCOUNTER — HOSPITAL ENCOUNTER (OUTPATIENT)
Dept: CARDIOLOGY | Facility: HOSPITAL | Age: 69
Discharge: HOME | End: 2024-01-12
Payer: COMMERCIAL

## 2024-01-12 DIAGNOSIS — I48.92 ATRIAL FLUTTER, UNSPECIFIED TYPE (MULTI): ICD-10-CM

## 2024-01-12 DIAGNOSIS — Z95.818 PRESENCE OF OTHER CARDIAC IMPLANTS AND GRAFTS: ICD-10-CM

## 2024-01-19 ENCOUNTER — HOSPITAL ENCOUNTER (OUTPATIENT)
Dept: CARDIOLOGY | Facility: HOSPITAL | Age: 69
Discharge: HOME | End: 2024-01-19
Payer: COMMERCIAL

## 2024-01-19 DIAGNOSIS — I48.92 ATRIAL FLUTTER, UNSPECIFIED TYPE (MULTI): ICD-10-CM

## 2024-01-19 DIAGNOSIS — Z95.818 PRESENCE OF OTHER CARDIAC IMPLANTS AND GRAFTS: ICD-10-CM

## 2024-01-31 ENCOUNTER — OFFICE VISIT (OUTPATIENT)
Dept: ENDOCRINOLOGY | Age: 69
End: 2024-01-31
Payer: MEDICARE

## 2024-01-31 VITALS
DIASTOLIC BLOOD PRESSURE: 77 MMHG | BODY MASS INDEX: 27.29 KG/M2 | HEIGHT: 63 IN | SYSTOLIC BLOOD PRESSURE: 138 MMHG | OXYGEN SATURATION: 97 % | WEIGHT: 154 LBS | HEART RATE: 62 BPM

## 2024-01-31 DIAGNOSIS — E05.90 HYPERTHYROIDISM: ICD-10-CM

## 2024-01-31 DIAGNOSIS — E05.90 HYPERTHYROIDISM: Primary | ICD-10-CM

## 2024-01-31 DIAGNOSIS — E05.20 GOITER, TOXIC, MULTINODULAR: ICD-10-CM

## 2024-01-31 LAB
ERYTHROCYTE [DISTWIDTH] IN BLOOD BY AUTOMATED COUNT: 12.9 % (ref 11.5–14.5)
HCT VFR BLD AUTO: 39.9 % (ref 37–47)
HGB BLD-MCNC: 13.2 G/DL (ref 12–16)
MCH RBC QN AUTO: 29.8 PG (ref 27–31.3)
MCHC RBC AUTO-ENTMCNC: 33.1 % (ref 33–37)
MCV RBC AUTO: 90.1 FL (ref 79.4–94.8)
PLATELET # BLD AUTO: 233 K/UL (ref 130–400)
RBC # BLD AUTO: 4.43 M/UL (ref 4.2–5.4)
T4 FREE SERPL-MCNC: 0.89 NG/DL (ref 0.84–1.68)
TSH SERPL-MCNC: 0.34 UIU/ML (ref 0.44–3.86)
WBC # BLD AUTO: 5.2 K/UL (ref 4.8–10.8)

## 2024-01-31 PROCEDURE — G8419 CALC BMI OUT NRM PARAM NOF/U: HCPCS | Performed by: INTERNAL MEDICINE

## 2024-01-31 PROCEDURE — G8400 PT W/DXA NO RESULTS DOC: HCPCS | Performed by: INTERNAL MEDICINE

## 2024-01-31 PROCEDURE — 1090F PRES/ABSN URINE INCON ASSESS: CPT | Performed by: INTERNAL MEDICINE

## 2024-01-31 PROCEDURE — G8484 FLU IMMUNIZE NO ADMIN: HCPCS | Performed by: INTERNAL MEDICINE

## 2024-01-31 PROCEDURE — G8427 DOCREV CUR MEDS BY ELIG CLIN: HCPCS | Performed by: INTERNAL MEDICINE

## 2024-01-31 PROCEDURE — 3017F COLORECTAL CA SCREEN DOC REV: CPT | Performed by: INTERNAL MEDICINE

## 2024-01-31 PROCEDURE — 1036F TOBACCO NON-USER: CPT | Performed by: INTERNAL MEDICINE

## 2024-01-31 PROCEDURE — 1123F ACP DISCUSS/DSCN MKR DOCD: CPT | Performed by: INTERNAL MEDICINE

## 2024-01-31 PROCEDURE — 99213 OFFICE O/P EST LOW 20 MIN: CPT | Performed by: INTERNAL MEDICINE

## 2024-01-31 RX ORDER — FAMOTIDINE 20 MG/1
20 TABLET, FILM COATED ORAL
COMMUNITY
Start: 2023-12-18

## 2024-01-31 RX ORDER — ISOSORBIDE DINITRATE 30 MG/1
30 TABLET ORAL 2 TIMES DAILY
COMMUNITY

## 2024-01-31 RX ORDER — OMEPRAZOLE 20 MG/1
CAPSULE, DELAYED RELEASE ORAL DAILY
COMMUNITY
Start: 2023-11-27

## 2024-01-31 NOTE — PROGRESS NOTES
Head: Normocephalic and atraumatic.      Right Ear: External ear normal.      Left Ear: External ear normal.      Nose: Nose normal.   Eyes:      General: No scleral icterus.        Right eye: No discharge.         Left eye: No discharge.      Extraocular Movements: Extraocular movements intact.      Conjunctiva/sclera: Conjunctivae normal.   Cardiovascular:      Rate and Rhythm: Normal rate.   Pulmonary:      Effort: Pulmonary effort is normal.   Musculoskeletal:         General: Normal range of motion.      Cervical back: Normal range of motion and neck supple.   Neurological:      General: No focal deficit present.      Mental Status: She is alert and oriented to person, place, and time.   Psychiatric:         Mood and Affect: Mood normal.         Behavior: Behavior normal.

## 2024-02-09 ENCOUNTER — HOSPITAL ENCOUNTER (OUTPATIENT)
Dept: CARDIOLOGY | Facility: HOSPITAL | Age: 69
Discharge: HOME | End: 2024-02-09
Payer: COMMERCIAL

## 2024-02-09 DIAGNOSIS — Z95.818 STATUS POST PLACEMENT OF IMPLANTABLE LOOP RECORDER: ICD-10-CM

## 2024-02-09 PROCEDURE — 93297 REM INTERROG DEV EVAL ICPMS: CPT

## 2024-02-09 PROCEDURE — 93298 REM INTERROG DEV EVAL SCRMS: CPT | Performed by: INTERNAL MEDICINE

## 2024-02-19 PROBLEM — Z95.818 IMPLANTABLE LOOP RECORDER PRESENT: Status: ACTIVE | Noted: 2024-02-19

## 2024-03-15 ENCOUNTER — HOSPITAL ENCOUNTER (OUTPATIENT)
Dept: CARDIOLOGY | Facility: HOSPITAL | Age: 69
Discharge: HOME | End: 2024-03-15
Payer: COMMERCIAL

## 2024-03-15 DIAGNOSIS — I48.92 ATRIAL FLUTTER, UNSPECIFIED TYPE (MULTI): ICD-10-CM

## 2024-03-15 DIAGNOSIS — Z95.818 PRESENCE OF OTHER CARDIAC IMPLANTS AND GRAFTS: ICD-10-CM

## 2024-03-15 PROCEDURE — 93298 REM INTERROG DEV EVAL SCRMS: CPT

## 2024-03-15 PROCEDURE — 93298 REM INTERROG DEV EVAL SCRMS: CPT | Performed by: INTERNAL MEDICINE

## 2024-04-19 ENCOUNTER — HOSPITAL ENCOUNTER (OUTPATIENT)
Dept: CARDIOLOGY | Facility: HOSPITAL | Age: 69
Discharge: HOME | End: 2024-04-19
Payer: COMMERCIAL

## 2024-04-19 DIAGNOSIS — Z95.818 STATUS POST PLACEMENT OF IMPLANTABLE LOOP RECORDER: ICD-10-CM

## 2024-04-19 PROCEDURE — 93298 REM INTERROG DEV EVAL SCRMS: CPT

## 2024-04-19 PROCEDURE — 93298 REM INTERROG DEV EVAL SCRMS: CPT | Performed by: INTERNAL MEDICINE

## 2024-05-17 ENCOUNTER — OFFICE VISIT (OUTPATIENT)
Dept: ENDOCRINOLOGY | Age: 69
End: 2024-05-17
Payer: MEDICARE

## 2024-05-17 VITALS
HEIGHT: 63 IN | DIASTOLIC BLOOD PRESSURE: 78 MMHG | BODY MASS INDEX: 26.58 KG/M2 | HEART RATE: 60 BPM | WEIGHT: 150 LBS | OXYGEN SATURATION: 97 % | SYSTOLIC BLOOD PRESSURE: 130 MMHG

## 2024-05-17 DIAGNOSIS — E05.90 HYPERTHYROIDISM: Primary | ICD-10-CM

## 2024-05-17 DIAGNOSIS — E04.1 RIGHT THYROID NODULE: ICD-10-CM

## 2024-05-17 DIAGNOSIS — E05.90 HYPERTHYROIDISM: ICD-10-CM

## 2024-05-17 DIAGNOSIS — E05.20 GOITER, TOXIC, MULTINODULAR: ICD-10-CM

## 2024-05-17 LAB
BASOPHILS # BLD: 0 K/UL (ref 0–0.2)
BASOPHILS NFR BLD: 0.7 %
EOSINOPHIL # BLD: 0.3 K/UL (ref 0–0.7)
EOSINOPHIL NFR BLD: 5 %
ERYTHROCYTE [DISTWIDTH] IN BLOOD BY AUTOMATED COUNT: 13 % (ref 11.5–14.5)
HCT VFR BLD AUTO: 39.2 % (ref 37–47)
HGB BLD-MCNC: 13.4 G/DL (ref 12–16)
LYMPHOCYTES # BLD: 2.3 K/UL (ref 1–4.8)
LYMPHOCYTES NFR BLD: 40.3 %
MCH RBC QN AUTO: 30.4 PG (ref 27–31.3)
MCHC RBC AUTO-ENTMCNC: 34.2 % (ref 33–37)
MCV RBC AUTO: 88.9 FL (ref 79.4–94.8)
MONOCYTES # BLD: 0.5 K/UL (ref 0.2–0.8)
MONOCYTES NFR BLD: 8.4 %
NEUTROPHILS # BLD: 2.6 K/UL (ref 1.4–6.5)
NEUTS SEG NFR BLD: 45.4 %
PLATELET # BLD AUTO: 234 K/UL (ref 130–400)
RBC # BLD AUTO: 4.41 M/UL (ref 4.2–5.4)
T4 FREE SERPL-MCNC: 0.97 NG/DL (ref 0.84–1.68)
TSH REFLEX: 0.29 UIU/ML (ref 0.44–3.86)
WBC # BLD AUTO: 5.8 K/UL (ref 4.8–10.8)

## 2024-05-17 PROCEDURE — G8427 DOCREV CUR MEDS BY ELIG CLIN: HCPCS | Performed by: INTERNAL MEDICINE

## 2024-05-17 PROCEDURE — 1123F ACP DISCUSS/DSCN MKR DOCD: CPT | Performed by: INTERNAL MEDICINE

## 2024-05-17 PROCEDURE — G8419 CALC BMI OUT NRM PARAM NOF/U: HCPCS | Performed by: INTERNAL MEDICINE

## 2024-05-17 PROCEDURE — 1036F TOBACCO NON-USER: CPT | Performed by: INTERNAL MEDICINE

## 2024-05-17 PROCEDURE — G8400 PT W/DXA NO RESULTS DOC: HCPCS | Performed by: INTERNAL MEDICINE

## 2024-05-17 PROCEDURE — 3017F COLORECTAL CA SCREEN DOC REV: CPT | Performed by: INTERNAL MEDICINE

## 2024-05-17 PROCEDURE — 1090F PRES/ABSN URINE INCON ASSESS: CPT | Performed by: INTERNAL MEDICINE

## 2024-05-17 PROCEDURE — 99213 OFFICE O/P EST LOW 20 MIN: CPT | Performed by: INTERNAL MEDICINE

## 2024-05-17 RX ORDER — METHIMAZOLE 5 MG/1
TABLET ORAL
Qty: 15 TABLET | Refills: 5 | Status: SHIPPED | OUTPATIENT
Start: 2024-05-17

## 2024-05-17 RX ORDER — ATORVASTATIN CALCIUM 20 MG/1
20 TABLET, FILM COATED ORAL DAILY
COMMUNITY
Start: 2024-05-10

## 2024-05-17 RX ORDER — FENOFIBRATE 120 MG/1
120 TABLET ORAL DAILY
COMMUNITY
Start: 2024-04-03

## 2024-05-17 NOTE — PROGRESS NOTES
5/17/2024    Assessment:       Diagnosis Orders   1. Hyperthyroidism  CBC with Auto Differential    TSH with Reflex    T4, Free      2. Goiter, toxic, multinodular  US HEAD NECK SOFT TISSUE THYROID      3. Right thyroid nodule              PLAN:     Orders Placed This Encounter   Procedures    US HEAD NECK SOFT TISSUE THYROID     This procedure can be scheduled via MyChart.      Standing Status:   Future     Standing Expiration Date:   5/17/2025    CBC with Auto Differential     Standing Status:   Future     Standing Expiration Date:   5/17/2025    TSH with Reflex     Standing Status:   Future     Standing Expiration Date:   5/17/2025    T4, Free     Standing Status:   Future     Standing Expiration Date:   5/17/2025     Orders Placed This Encounter   Medications    methIMAzole (TAPAZOLE) 5 MG tablet     Sig: TAKE 1 TABLET BY MOUTH 3 TIMES A WEEK ON MONDAY WEDNESDAY AND FRIDAY     Dispense:  15 tablet     Refill:  5   Continue current dose of Tapazole repeat thyroid ultrasound prior to next visit    Subjective:     Chief Complaint   Patient presents with    Thyroid Problem    Goiter     Vitals:    05/17/24 1132   BP: 130/78   Site: Right Upper Arm   Position: Sitting   Cuff Size: Small Adult   Pulse: 60   SpO2: 97%   Weight: 68 kg (150 lb)   Height: 1.6 m (5' 2.99\")     Wt Readings from Last 3 Encounters:   05/17/24 68 kg (150 lb)   01/31/24 69.9 kg (154 lb)   11/07/23 68.5 kg (151 lb)     BP Readings from Last 3 Encounters:   05/17/24 130/78   01/31/24 138/77   11/07/23 110/67     Follow-up on hyper thyroidism on Tapazole 5 mg 3 days a week TSH was close to normal history of goiter right thyroid nodule status post biopsy size 1.8 cm done in December denies any neck pain difficulty swallowing    Thyroid Problem  Presents for follow-up visit. Patient reports no cold intolerance, heat intolerance, palpitations or tremors. The symptoms have been stable.          Latest Reference Range & Units 01/31/24 13:42 05/17/24

## 2024-05-24 ENCOUNTER — HOSPITAL ENCOUNTER (OUTPATIENT)
Dept: CARDIOLOGY | Facility: HOSPITAL | Age: 69
Discharge: HOME | End: 2024-05-24
Payer: COMMERCIAL

## 2024-05-24 DIAGNOSIS — Z95.818 STATUS POST PLACEMENT OF IMPLANTABLE LOOP RECORDER: ICD-10-CM

## 2024-05-24 DIAGNOSIS — I48.92 ATRIAL FLUTTER, UNSPECIFIED TYPE (MULTI): ICD-10-CM

## 2024-05-24 PROCEDURE — 93298 REM INTERROG DEV EVAL SCRMS: CPT | Performed by: INTERNAL MEDICINE

## 2024-05-24 PROCEDURE — 93298 REM INTERROG DEV EVAL SCRMS: CPT

## 2024-05-24 RX ORDER — FLECAINIDE ACETATE 150 MG/1
75 TABLET ORAL 2 TIMES DAILY
Qty: 90 TABLET | Refills: 1 | Status: SHIPPED | OUTPATIENT
Start: 2024-05-24

## 2024-05-24 RX ORDER — APIXABAN 5 MG/1
5 TABLET, FILM COATED ORAL 2 TIMES DAILY
Qty: 180 TABLET | Refills: 3 | Status: SHIPPED | OUTPATIENT
Start: 2024-05-24

## 2024-05-24 NOTE — TELEPHONE ENCOUNTER
Received request for prescription refills for patient.   Patient follows with Dr. Dilan Le MD      Request is for Flecainide, Eliquis   Is patient currently on medication, YES     Last OV 11/29/23 with Dr. Dilan Le MD    Next OV 5/29/24 with Dr. Dilan eL MD      Pended for signing and sent to provider

## 2024-05-29 ENCOUNTER — OFFICE VISIT (OUTPATIENT)
Dept: CARDIOLOGY | Facility: CLINIC | Age: 69
End: 2024-05-29
Payer: MEDICARE

## 2024-05-29 ENCOUNTER — HOSPITAL ENCOUNTER (OUTPATIENT)
Dept: CARDIOLOGY | Facility: HOSPITAL | Age: 69
Discharge: HOME | End: 2024-05-29
Payer: MEDICARE

## 2024-05-29 VITALS
BODY MASS INDEX: 25.78 KG/M2 | WEIGHT: 151 LBS | DIASTOLIC BLOOD PRESSURE: 68 MMHG | SYSTOLIC BLOOD PRESSURE: 110 MMHG | HEIGHT: 64 IN | HEART RATE: 67 BPM

## 2024-05-29 DIAGNOSIS — R94.31 ABNORMAL EKG: ICD-10-CM

## 2024-05-29 DIAGNOSIS — Z51.81 ANTICOAGULATION MANAGEMENT ENCOUNTER: ICD-10-CM

## 2024-05-29 DIAGNOSIS — I48.0 PAROXYSMAL ATRIAL FIBRILLATION (MULTI): ICD-10-CM

## 2024-05-29 DIAGNOSIS — Z95.818 STATUS POST PLACEMENT OF IMPLANTABLE LOOP RECORDER: Primary | ICD-10-CM

## 2024-05-29 DIAGNOSIS — F17.200 TOBACCO USE DISORDER: ICD-10-CM

## 2024-05-29 DIAGNOSIS — Z87.891 FORMER SMOKER: ICD-10-CM

## 2024-05-29 DIAGNOSIS — I48.11 LONGSTANDING PERSISTENT ATRIAL FIBRILLATION (MULTI): ICD-10-CM

## 2024-05-29 DIAGNOSIS — Z95.818 PRESENCE OF CARDIAC DEVICE: ICD-10-CM

## 2024-05-29 DIAGNOSIS — Z79.01 ANTICOAGULATION MANAGEMENT ENCOUNTER: ICD-10-CM

## 2024-05-29 PROCEDURE — 93291 INTERROG DEV EVAL SCRMS IP: CPT | Performed by: INTERNAL MEDICINE

## 2024-05-29 PROCEDURE — 93000 ELECTROCARDIOGRAM COMPLETE: CPT | Mod: DISTINCT PROCEDURAL SERVICE | Performed by: INTERNAL MEDICINE

## 2024-05-29 PROCEDURE — 1159F MED LIST DOCD IN RCRD: CPT | Performed by: INTERNAL MEDICINE

## 2024-05-29 PROCEDURE — 3008F BODY MASS INDEX DOCD: CPT | Performed by: INTERNAL MEDICINE

## 2024-05-29 PROCEDURE — 99214 OFFICE O/P EST MOD 30 MIN: CPT | Performed by: INTERNAL MEDICINE

## 2024-05-29 PROCEDURE — 93291 INTERROG DEV EVAL SCRMS IP: CPT

## 2024-05-29 RX ORDER — DENOSUMAB 60 MG/ML
60 INJECTION SUBCUTANEOUS
COMMUNITY

## 2024-05-29 ASSESSMENT — ENCOUNTER SYMPTOMS
PALPITATIONS: 0
DYSPNEA ON EXERTION: 0

## 2024-05-29 NOTE — PATIENT INSTRUCTIONS
Continue same medications/treatment.  Patient educated on proper medication use.  Patient educated on risk factor modification.  Please bring any lab results from other providers/physicians to your next appointment.    Please bring all medicines, vitamins, and herbal supplements with you when you come to the office.    Prescriptions will not be filled unless you are compliant with your follow up appointments or have a follow up appointment scheduled as per instruction of your physician. Refills should be requested at the time of your visit.    Follow up with Malia in 6 months with device check  Continue remote checks monthly    Bere CAMARA RN, AM SCRIBING FOR, AND IN THE PRESENCE OF DR. GATITO PASCUAL MD

## 2024-05-29 NOTE — PROGRESS NOTES
CARDIOLOGY OFFICE VISIT      CHIEF COMPLAINT  Chief Complaint   Patient presents with    Follow-up     6 mos with device check       HISTORY OF PRESENT ILLNESS  HPI  68-year-old female with a past medical history of hypertension and hyperthyroidism on methimazole therapy followed by endocrinology service. Patient states that for the last 2 to 3 years she has been noticing palpitations on and off but for the last 6 months they are becoming more frequent. Patient was having emergency department visits for palpitations at some point last year. She had some EKG changes and she underwent an a stress test. During the stress that she developed angina with ST depressions. She underwent a cardiac catheterization that showed normal coronary arteries but evidence of spasm in the LAD and diagonal. Since then she was placed on isosorbide therapy. She continues having fluttering. She had an event monitor ordered in March 2023. Event monitor showed frequent episodes of atrial fibrillation with rapid ventricular response. Main rhythm was sinus rhythm. She was placed on Lopressor therapy but apparently she noticed her palpitations more more frequent and she discontinued this medication. She was placed on calcium channel blocker but also she had the same sensation of palpitations increasing with this medication and she decided to stop.  Patient was placed on beta-blocker therapy. Echocardiogram in 2020 shows normal left ventricular function value 55%.      She was placed on flecainide therapy due to persistent atrial fibrillation.  She also underwent loop recorder implantation in July 2023    Since the last office visit she has been doing well.  She denies any symptoms of chest pain or shortness of breath or palpitations.    Patient is still on flecainide therapy.  Her loop recorder since the last office visit has not seen any significant atrial or ventricular arrhythmias.    EKG performed today shows sinus rhythm with rate of 67 bpm  QRS ration 92 ms QT corrected 435 ms.  Rhythm strip shows the same pattern.            Past Medical History  No past medical history on file.    Social History  Social History     Tobacco Use    Smoking status: Former     Types: Cigarettes    Smokeless tobacco: Not on file   Substance Use Topics    Alcohol use: Not Currently    Drug use: Not Currently       Family History     Family History   Problem Relation Name Age of Onset    Other (cerebrovascular accident) Mother      Heart attack Father      Febrile seizures Other      Febrile seizures Sibling          Allergies:  Allergies   Allergen Reactions    Amoxicillin Shortness of breath, Hives and Other     Other Reaction(s): hives,    Other reaction(s): hives,   Other Reaction(s): hives,    Acetaminophen Unknown    Aspirin GI intolerance, GI Upset, Hives, Other and Unknown    Cat Dander Unknown    Codeine Other, Hives and Unknown     Other Reaction(s): Elevated heart rate with elevated blood pressure and diagnosis of hypertension    Hydrocodone-Acetaminophen Other     Other Reaction(s): AOF    Keflex [Cephalexin] Unknown    Pseudoephedrine Hives, Other, Palpitations and Unknown     Other Reaction(s): Tachycardia      Other reaction(s): Other: See Comments Heart races      Heart races    Heart races    Other reaction(s): Other: See Comments   Heart races        Outpatient Medications:  Current Outpatient Medications   Medication Instructions    ascorbic acid, vitamin C, 100 mg chewable tablet 1 tablet, Daily    atorvastatin (LIPITOR) 20 mg, oral, Daily    azelastine (Astelin) 137 mcg (0.1 %) nasal spray 2 sprays, Each Nostril, 2 times daily    Bifidobacterium infantis (Align) 4 mg capsule 1 capsule, Daily    calcium carbonate-vitamin D3 500 mg-5 mcg (200 unit) tablet 1 tablet, oral, Daily    cholecalciferol (VITAMIN D-3) 10,000 Units, oral, Daily RT    Creon 24,000-76,000 -120,000 unit capsule TAKE 1 CAPSULE BY MOUTH 3 TIMES A DAY WITH MEAL AND SNACK    Eliquis 5 mg,  oral, 2 times daily    ferrous sulfate 325 (65 Fe) MG EC tablet 1 tablet, oral, Daily    FIBER, CALCIUM POLYCARBOPHIL, ORAL Gummy, Refills(s) 0    flecainide (TAMBOCOR) 75 mg, oral, 2 times daily    fluticasone (Flonase) 50 mcg/actuation nasal spray 1 spray, nasal, Daily RT    isosorbide mononitrate ER (Imdur) 30 mg 24 hr tablet 1 tablet, oral, 2 times daily    latanoprost (Xalatan) 0.005 % ophthalmic solution 1 drop, Both Eyes, Nightly    lutein 20 mg, oral, Daily RT    methIMAzole (Tapazole) 5 mg tablet TAKE 1 TABLET BY MOUTH 3 TIMES A WEEK ON MONDAY WEDNESDAY AND FRIDAY    metoprolol tartrate (LOPRESSOR) 25 mg, oral, 2 times daily    multivitamin with minerals (multivitamin) tablet 1 tablet, oral, Daily RT    omeprazole (PRILOSEC) 20 mg, oral, Daily    Pepcid 20 mg, oral, Daily    Prolia 60 mg, subcutaneous, Every 6 months    vitamin E 45 mg (100 unit) capsule Vitamin E          REVIEW OF SYSTEMS  Review of Systems   Cardiovascular:  Negative for chest pain, dyspnea on exertion and palpitations.   All other systems reviewed and are negative.        VITALS  Vitals:    05/29/24 0955   BP: 110/68   Pulse: 67       PHYSICAL EXAM  Constitutional:       General: Awake.      Appearance: Normal and healthy appearance. Well-developed and not in distress.   Neck:      Vascular: No JVR. JVD normal.   Pulmonary:      Effort: Pulmonary effort is normal.      Breath sounds: Normal breath sounds. No wheezing. No rhonchi. No rales.   Chest:      Chest wall: Not tender to palpatation.      Comments: Loop recorder in place  Cardiovascular:      PMI at left midclavicular line. Normal rate. Regular rhythm. Normal S1. Normal S2.       Murmurs: There is no murmur.      No gallop.  No click. No rub.   Pulses:     Intact distal pulses.   Edema:     Peripheral edema absent.   Abdominal:      Tenderness: There is no abdominal tenderness.   Musculoskeletal: Normal range of motion.         General: No tenderness. Skin:     General: Skin is  warm and dry.   Neurological:      General: No focal deficit present.      Mental Status: Alert and oriented to person, place and time.           ASSESSMENT AND PLAN  Clinical impression     1. Palpitations  2. Evidence of atrial fibrillation on event monitor with rapid ventricular response  3. Hyperthyroidism on treatment with methimazole  4. Normal coronary arteries with evidence of coronary spasm of the LAD and diagonal by cardiac catheterization in 2022  5. Normal left ventricular function per echocardiogram in 2020  6. Hypertension  7.  High risk medication (flecainide)  8.  Status post loop recorder implantation in July 2023 with no complications    Plan recommendations    From the electrophysiology standpoint she is doing well.  Will continue with current medical therapy.    Follow-up with device clinic as scheduled to continue watching burden of atrial and ventricular arrhythmias by device interrogation.    Continue with high risk medication (flecainide).    Continue Eliquis therapy.    Follow my office in 6 months or sooner if needed.    Risk factor modification and lifestyle modification discussed with patient. Diet , exercise and hydration discussed with patient.    I have personally review with patient during this office visit, laboratory data, echocardiogram results, stress test results, Holter-event monitor results prior and after the last electrophysiology visit. All questions has been answered.    Please excuse any errors in grammar or translation related to this dictation.  Voice recognition software was utilized to prepare this document.

## 2024-07-05 ENCOUNTER — HOSPITAL ENCOUNTER (OUTPATIENT)
Dept: CARDIOLOGY | Facility: HOSPITAL | Age: 69
Discharge: HOME | End: 2024-07-05

## 2024-07-05 DIAGNOSIS — Z95.818 STATUS POST PLACEMENT OF IMPLANTABLE LOOP RECORDER: ICD-10-CM

## 2024-07-05 PROCEDURE — 93298 REM INTERROG DEV EVAL SCRMS: CPT

## 2024-07-23 ENCOUNTER — TELEPHONE (OUTPATIENT)
Dept: ENDOCRINOLOGY | Age: 69
End: 2024-07-23

## 2024-07-23 NOTE — TELEPHONE ENCOUNTER
Patient called for results of her US thyroid , results are in [facility] 6-6-24  Please advise

## 2024-08-09 ENCOUNTER — HOSPITAL ENCOUNTER (OUTPATIENT)
Dept: CARDIOLOGY | Facility: HOSPITAL | Age: 69
Discharge: HOME | End: 2024-08-09
Payer: MEDICARE

## 2024-08-09 DIAGNOSIS — Z95.818 STATUS POST PLACEMENT OF IMPLANTABLE LOOP RECORDER: ICD-10-CM

## 2024-08-09 PROCEDURE — 93298 REM INTERROG DEV EVAL SCRMS: CPT

## 2024-08-09 PROCEDURE — 93298 REM INTERROG DEV EVAL SCRMS: CPT | Performed by: INTERNAL MEDICINE

## 2024-08-19 ENCOUNTER — PATIENT MESSAGE (OUTPATIENT)
Dept: CARDIOLOGY | Facility: CLINIC | Age: 69
End: 2024-08-19
Payer: MEDICARE

## 2024-08-19 DIAGNOSIS — R00.2 PALPITATIONS: ICD-10-CM

## 2024-08-19 RX ORDER — METOPROLOL TARTRATE 25 MG/1
TABLET, FILM COATED ORAL
Qty: 360 TABLET | Refills: 3 | Status: SHIPPED | OUTPATIENT
Start: 2024-08-19 | End: 2024-08-20 | Stop reason: ALTCHOICE

## 2024-08-19 NOTE — TELEPHONE ENCOUNTER
Received request for prescription refills for patient.   Patient follows with DR. Dilan Le M.D.     Request is for refill  Is patient currently on medication yes    Last OV 5/29/24  Next OV 12/20/224    Pended for signing and sent to provider

## 2024-08-20 ENCOUNTER — TELEPHONE (OUTPATIENT)
Dept: CARDIOLOGY | Facility: CLINIC | Age: 69
End: 2024-08-20
Payer: MEDICARE

## 2024-08-20 DIAGNOSIS — I48.92 ATRIAL FLUTTER, UNSPECIFIED TYPE (MULTI): ICD-10-CM

## 2024-08-20 RX ORDER — FLECAINIDE ACETATE 150 MG/1
TABLET ORAL 2 TIMES DAILY
Qty: 90 TABLET | Refills: 3 | Status: SHIPPED | OUTPATIENT
Start: 2024-08-20

## 2024-08-20 NOTE — TELEPHONE ENCOUNTER
Received request for prescription refills for patient.   Patient follows with DR. Dilan Le M.D.     Request is for refill  Is patient currently on medication yes    Last OV 5/29/24  Next OV 12/2/24    Pended for signing and sent to provider   
The patient is a 21y Female complaining of head injury.

## 2024-09-13 ENCOUNTER — HOSPITAL ENCOUNTER (OUTPATIENT)
Dept: CARDIOLOGY | Facility: HOSPITAL | Age: 69
Discharge: HOME | End: 2024-09-13
Payer: MEDICARE

## 2024-09-13 DIAGNOSIS — Z95.818 STATUS POST PLACEMENT OF IMPLANTABLE LOOP RECORDER: ICD-10-CM

## 2024-09-13 PROCEDURE — 93298 REM INTERROG DEV EVAL SCRMS: CPT | Performed by: INTERNAL MEDICINE

## 2024-09-13 PROCEDURE — 93298 REM INTERROG DEV EVAL SCRMS: CPT

## 2024-10-01 ENCOUNTER — PATIENT MESSAGE (OUTPATIENT)
Dept: CARDIOLOGY | Facility: CLINIC | Age: 69
End: 2024-10-01
Payer: MEDICARE

## 2024-10-18 ENCOUNTER — HOSPITAL ENCOUNTER (OUTPATIENT)
Dept: CARDIOLOGY | Facility: HOSPITAL | Age: 69
Discharge: HOME | End: 2024-10-18
Payer: MEDICARE

## 2024-10-18 DIAGNOSIS — Z95.818 STATUS POST PLACEMENT OF IMPLANTABLE LOOP RECORDER: ICD-10-CM

## 2024-10-18 PROCEDURE — 93298 REM INTERROG DEV EVAL SCRMS: CPT

## 2024-10-18 PROCEDURE — 93298 REM INTERROG DEV EVAL SCRMS: CPT | Performed by: INTERNAL MEDICINE

## 2024-10-25 ENCOUNTER — TELEPHONE (OUTPATIENT)
Dept: CARDIOLOGY | Facility: CLINIC | Age: 69
End: 2024-10-25
Payer: COMMERCIAL

## 2024-10-25 NOTE — TELEPHONE ENCOUNTER
Called and spoke to the patient regarding the appointment scheduled with Malia on 12/02. I informed her that due to Malia resigning we needed to reschedule the appointment to be with . We reschedule the appointment to be on 01/29/2025 at 9am for device check and 930am for the appointment with .

## 2024-11-08 ENCOUNTER — HOSPITAL ENCOUNTER (OUTPATIENT)
Dept: CARDIOLOGY | Facility: HOSPITAL | Age: 69
Discharge: HOME | End: 2024-11-08
Payer: COMMERCIAL

## 2024-11-08 DIAGNOSIS — Z95.818 STATUS POST PLACEMENT OF IMPLANTABLE LOOP RECORDER: ICD-10-CM

## 2024-11-15 ENCOUNTER — HOSPITAL ENCOUNTER (OUTPATIENT)
Dept: CARDIOLOGY | Facility: HOSPITAL | Age: 69
Discharge: HOME | End: 2024-11-15
Payer: COMMERCIAL

## 2024-11-15 DIAGNOSIS — Z95.818 STATUS POST PLACEMENT OF IMPLANTABLE LOOP RECORDER: ICD-10-CM

## 2024-11-18 ENCOUNTER — OFFICE VISIT (OUTPATIENT)
Dept: ENDOCRINOLOGY | Age: 69
End: 2024-11-18
Payer: COMMERCIAL

## 2024-11-18 VITALS
BODY MASS INDEX: 25.44 KG/M2 | WEIGHT: 149 LBS | HEIGHT: 64 IN | SYSTOLIC BLOOD PRESSURE: 132 MMHG | OXYGEN SATURATION: 96 % | DIASTOLIC BLOOD PRESSURE: 77 MMHG

## 2024-11-18 DIAGNOSIS — E05.20 GOITER, TOXIC, MULTINODULAR: ICD-10-CM

## 2024-11-18 DIAGNOSIS — E05.90 HYPERTHYROIDISM: Primary | ICD-10-CM

## 2024-11-18 DIAGNOSIS — E04.1 RIGHT THYROID NODULE: ICD-10-CM

## 2024-11-18 PROCEDURE — 1123F ACP DISCUSS/DSCN MKR DOCD: CPT | Performed by: INTERNAL MEDICINE

## 2024-11-18 PROCEDURE — 1159F MED LIST DOCD IN RCRD: CPT | Performed by: INTERNAL MEDICINE

## 2024-11-18 PROCEDURE — 99213 OFFICE O/P EST LOW 20 MIN: CPT | Performed by: INTERNAL MEDICINE

## 2024-11-18 RX ORDER — LISINOPRIL 5 MG/1
5 TABLET ORAL
COMMUNITY
Start: 2024-11-04

## 2024-11-18 RX ORDER — ESOMEPRAZOLE MAGNESIUM 40 MG/1
40 CAPSULE, DELAYED RELEASE ORAL 2 TIMES DAILY
COMMUNITY
Start: 2024-08-22

## 2024-11-18 NOTE — PROGRESS NOTES
11/18/2024    Assessment:       Diagnosis Orders   1. Hyperthyroidism        2. Goiter, toxic, multinodular        3. Right thyroid nodule              PLAN:       Orders Placed This Encounter   Procedures    US HEAD NECK SOFT TISSUE THYROID     This procedure can be scheduled via MyChart.      Standing Status:   Future     Standing Expiration Date:   11/18/2025    T4, Free     Standing Status:   Future     Standing Expiration Date:   11/18/2025    TSH with Reflex     Standing Status:   Future     Standing Expiration Date:   11/18/2025    CBC     Standing Status:   Future     Standing Expiration Date:   11/18/2025   Continue current dose of Tapazole 5 mg 3 times per week repeat labs ultrasound in 6 to 12 months      Subjective:     Chief Complaint   Patient presents with    Hyperthyroidism    Goiter     Right thyroid nodule      Vitals:    11/18/24 1002   BP: 132/77   SpO2: 96%   Weight: 67.6 kg (149 lb)   Height: 1.626 m (5' 4\")     Wt Readings from Last 3 Encounters:   11/18/24 67.6 kg (149 lb)   05/17/24 68 kg (150 lb)   01/31/24 69.9 kg (154 lb)     BP Readings from Last 3 Encounters:   11/18/24 132/77   05/17/24 130/78   01/31/24 138/77     6 months follow-up on hyper thyroidism history of goiter thyroid 1.8 cm right sided nodule-most no recent labs to review no  thyroid ultrasound done which was stable currently on Tapazole 5 mg 3 times weekly    Thyroid Problem  Presents for follow-up visit. Patient reports no cold intolerance, heat intolerance, palpitations or tremors. The symptoms have been stable.     Past Medical History:   Diagnosis Date    Arthritis     PONV (postoperative nausea and vomiting)     nausea and vomiting after all six surgeries     Past Surgical History:   Procedure Laterality Date    BUNIONECTOMY Left 2018    CHOLECYSTECTOMY  2004    COLONOSCOPY  2019    ENDOSCOPY, COLON, DIAGNOSTIC  2019    EYE SURGERY Right 2011    cataracts    HYSTERECTOMY (CERVIX STATUS UNKNOWN)  2004    KIDNEY SURGERY

## 2024-11-22 ENCOUNTER — HOSPITAL ENCOUNTER (OUTPATIENT)
Dept: CARDIOLOGY | Facility: HOSPITAL | Age: 69
Discharge: HOME | End: 2024-11-22
Payer: COMMERCIAL

## 2024-11-22 DIAGNOSIS — Z95.818 STATUS POST PLACEMENT OF IMPLANTABLE LOOP RECORDER: ICD-10-CM

## 2024-11-22 PROCEDURE — 93298 REM INTERROG DEV EVAL SCRMS: CPT

## 2024-11-26 RX ORDER — METHIMAZOLE 5 MG/1
TABLET ORAL
Qty: 36 TABLET | Refills: 2 | Status: SHIPPED | OUTPATIENT
Start: 2024-11-26

## 2024-11-27 ENCOUNTER — TELEPHONE (OUTPATIENT)
Dept: ENDOCRINOLOGY | Age: 69
End: 2024-11-27

## 2024-11-27 NOTE — TELEPHONE ENCOUNTER
Pt calling when she was in she was never told when to do her ultrasound and blood work.  She went and had both done.  She had just had a previous ultrasound done in June.  She didn't think she was supposed to do these things yet and she wants to know what you want her to do

## 2024-12-02 ENCOUNTER — APPOINTMENT (OUTPATIENT)
Dept: CARDIOLOGY | Facility: HOSPITAL | Age: 69
End: 2024-12-02
Payer: MEDICARE

## 2024-12-02 ENCOUNTER — APPOINTMENT (OUTPATIENT)
Dept: CARDIOLOGY | Facility: CLINIC | Age: 69
End: 2024-12-02
Payer: MEDICARE

## 2024-12-02 NOTE — TELEPHONE ENCOUNTER
Ultrasound was supposed to be done in 6 to 12 months time the patient already had ultrasound we can have her follow-up in the next 4 to 6 weeks to have the results reviewed check with patient where both ultrasound of labs done?

## 2024-12-04 ENCOUNTER — TELEPHONE (OUTPATIENT)
Dept: ENDOCRINOLOGY | Age: 69
End: 2024-12-04

## 2024-12-04 NOTE — TELEPHONE ENCOUNTER
Patient went NP today Np wanted her to take the methlmazole 2x weekly wanted you to be aware and that you are ok with it . Change due to her current labs (tsh possibly )    Also can you advise if she needs labs done again just had some 3 weeks ago .. Patient has appt 1-6 to discuss scan

## 2024-12-04 NOTE — TELEPHONE ENCOUNTER
Left vm for patient to call and schedule a follow up for the Ultrasound in the next 4 to 6 weeks . Please remind pt to get labs done prior to visit .

## 2024-12-20 ENCOUNTER — HOSPITAL ENCOUNTER (OUTPATIENT)
Dept: CARDIOLOGY | Facility: HOSPITAL | Age: 69
Discharge: HOME | End: 2024-12-20
Payer: COMMERCIAL

## 2024-12-20 DIAGNOSIS — I48.91 ATRIAL FIBRILLATION AND FLUTTER: ICD-10-CM

## 2024-12-20 DIAGNOSIS — Z95.818 PRESENCE OF OTHER CARDIAC IMPLANTS AND GRAFTS: ICD-10-CM

## 2024-12-20 DIAGNOSIS — I48.92 ATRIAL FIBRILLATION AND FLUTTER: ICD-10-CM

## 2024-12-27 ENCOUNTER — HOSPITAL ENCOUNTER (OUTPATIENT)
Dept: CARDIOLOGY | Facility: HOSPITAL | Age: 69
Discharge: HOME | End: 2024-12-27
Payer: COMMERCIAL

## 2024-12-27 DIAGNOSIS — Z95.818 PRESENCE OF OTHER CARDIAC IMPLANTS AND GRAFTS: ICD-10-CM

## 2024-12-27 DIAGNOSIS — I48.91 ATRIAL FIBRILLATION AND FLUTTER: ICD-10-CM

## 2024-12-27 DIAGNOSIS — I48.92 ATRIAL FIBRILLATION AND FLUTTER: ICD-10-CM

## 2024-12-27 PROCEDURE — 93298 REM INTERROG DEV EVAL SCRMS: CPT

## 2025-01-06 ENCOUNTER — OFFICE VISIT (OUTPATIENT)
Dept: ENDOCRINOLOGY | Age: 70
End: 2025-01-06
Payer: COMMERCIAL

## 2025-01-06 VITALS
BODY MASS INDEX: 25.1 KG/M2 | OXYGEN SATURATION: 96 % | DIASTOLIC BLOOD PRESSURE: 79 MMHG | SYSTOLIC BLOOD PRESSURE: 133 MMHG | HEIGHT: 64 IN | WEIGHT: 147 LBS

## 2025-01-06 DIAGNOSIS — E05.20 GOITER, TOXIC, MULTINODULAR: ICD-10-CM

## 2025-01-06 DIAGNOSIS — E04.1 RIGHT THYROID NODULE: ICD-10-CM

## 2025-01-06 DIAGNOSIS — E05.90 HYPERTHYROIDISM: Primary | ICD-10-CM

## 2025-01-06 PROCEDURE — 1123F ACP DISCUSS/DSCN MKR DOCD: CPT | Performed by: INTERNAL MEDICINE

## 2025-01-06 PROCEDURE — 99213 OFFICE O/P EST LOW 20 MIN: CPT | Performed by: INTERNAL MEDICINE

## 2025-01-06 PROCEDURE — 1159F MED LIST DOCD IN RCRD: CPT | Performed by: INTERNAL MEDICINE

## 2025-01-06 NOTE — PROGRESS NOTES
daily, Disp: , Rfl:     isosorbide dinitrate (ISORDIL) 30 MG tablet, Take 1 tablet by mouth 2 times daily, Disp: , Rfl:     Omega-3 Fatty Acids (FISH OIL) 1000 MG capsule, Take 1 capsule by mouth, Disp: , Rfl:     amLODIPine (NORVASC) 2.5 MG tablet, Take 1 tablet by mouth, Disp: , Rfl:     apixaban (ELIQUIS) 5 MG TABS tablet, Take 1 tablet by mouth 2 times daily, Disp: , Rfl:     Ascorbic Acid 100 MG CHEW, Vitamin C, Disp: , Rfl:     calcium carbonate 1500 (600 Ca) MG TABS tablet, Take 1 tablet by mouth 2 times daily (with meals), Disp: , Rfl:     denosumab (PROLIA) 60 MG/ML SOSY SC injection, Inject 1 mL into the skin, Disp: , Rfl:     ferrous sulfate (FE TABS 325) 325 (65 Fe) MG EC tablet, Take 1 tablet by mouth daily, Disp: , Rfl:     flecainide (TAMBOCOR) 150 MG tablet, Take 0.5 tablets by mouth, Disp: , Rfl:     isosorbide mononitrate (IMDUR) 30 MG extended release tablet, Take 1 tablet by mouth 2 times daily, Disp: , Rfl:     latanoprost (XALATAN) 0.005 % ophthalmic solution, INSTILL 1 DROP INTO BOTH EYES AT BEDTIME, Disp: , Rfl:     metoprolol tartrate (LOPRESSOR) 25 MG tablet, Take 2 tablets by mouth, Disp: , Rfl:     lipase-protease-amylase (CREON) 18001-47680 units delayed release capsule, TAKE 1 CAPSULE BY MOUTH 3 TIMES A DAY WITH MEAL AND SNACK, Disp: , Rfl:     Probiotic Product (ALIGN) 4 MG CAPS, Align, Disp: , Rfl:     ipratropium (ATROVENT) 0.03 % nasal spray, 2 sprays by Each Nostril route daily, Disp: 1 Bottle, Rfl: 3    Calcium Polycarbophil (FIBER-CAPS PO), Take 1 capsule by mouth daily, Disp: , Rfl:     Multiple Vitamins-Minerals (THERAPEUTIC MULTIVITAMIN-MINERALS) tablet, Take 1 tablet by mouth daily, Disp: , Rfl:     LUTEIN-ZEAXANTHIN PO, Take 1 tablet by mouth daily, Disp: , Rfl:     vitamin E 400 UNIT capsule, Take 1 capsule by mouth daily, Disp: , Rfl:     Calcium Carb-Cholecalciferol (CALCIUM-VITAMIN D) 500-200 MG-UNIT per tablet, Take 1 tablet by mouth daily, Disp: , Rfl:   Lab Results

## 2025-01-29 ENCOUNTER — APPOINTMENT (OUTPATIENT)
Dept: CARDIOLOGY | Facility: CLINIC | Age: 70
End: 2025-01-29
Payer: COMMERCIAL

## 2025-01-29 ENCOUNTER — HOSPITAL ENCOUNTER (OUTPATIENT)
Dept: CARDIOLOGY | Facility: HOSPITAL | Age: 70
Discharge: HOME | End: 2025-01-29
Payer: COMMERCIAL

## 2025-01-29 VITALS
SYSTOLIC BLOOD PRESSURE: 126 MMHG | HEART RATE: 76 BPM | DIASTOLIC BLOOD PRESSURE: 88 MMHG | HEIGHT: 64 IN | BODY MASS INDEX: 25.32 KG/M2 | WEIGHT: 148.3 LBS

## 2025-01-29 DIAGNOSIS — Z87.891 FORMER SMOKER: ICD-10-CM

## 2025-01-29 DIAGNOSIS — I48.0 PAROXYSMAL ATRIAL FIBRILLATION (MULTI): Primary | ICD-10-CM

## 2025-01-29 DIAGNOSIS — Z95.818 PRESENCE OF OTHER CARDIAC IMPLANTS AND GRAFTS: ICD-10-CM

## 2025-01-29 DIAGNOSIS — Z95.818 STATUS POST PLACEMENT OF IMPLANTABLE LOOP RECORDER: ICD-10-CM

## 2025-01-29 DIAGNOSIS — I48.92 ATRIAL FIBRILLATION AND FLUTTER: ICD-10-CM

## 2025-01-29 DIAGNOSIS — Z95.818 PRESENCE OF CARDIAC DEVICE: ICD-10-CM

## 2025-01-29 DIAGNOSIS — I48.91 ATRIAL FIBRILLATION AND FLUTTER: ICD-10-CM

## 2025-01-29 PROCEDURE — 93291 INTERROG DEV EVAL SCRMS IP: CPT

## 2025-01-29 PROCEDURE — 93000 ELECTROCARDIOGRAM COMPLETE: CPT | Mod: DISTINCT PROCEDURAL SERVICE | Performed by: INTERNAL MEDICINE

## 2025-01-29 PROCEDURE — 1159F MED LIST DOCD IN RCRD: CPT | Performed by: INTERNAL MEDICINE

## 2025-01-29 PROCEDURE — 99215 OFFICE O/P EST HI 40 MIN: CPT | Performed by: INTERNAL MEDICINE

## 2025-01-29 PROCEDURE — 93291 INTERROG DEV EVAL SCRMS IP: CPT | Performed by: INTERNAL MEDICINE

## 2025-01-29 PROCEDURE — 3008F BODY MASS INDEX DOCD: CPT | Performed by: INTERNAL MEDICINE

## 2025-01-29 RX ORDER — AMLODIPINE BESYLATE 10 MG/1
10 TABLET ORAL
Qty: 90 TABLET | Refills: 3 | Status: SHIPPED | OUTPATIENT
Start: 2025-01-29 | End: 2026-01-29

## 2025-01-29 RX ORDER — FENOFIBRATE 160 MG/1
1 TABLET ORAL
COMMUNITY
Start: 2025-01-16

## 2025-01-29 RX ORDER — AMLODIPINE BESYLATE 5 MG/1
1 TABLET ORAL
COMMUNITY
Start: 2025-01-15 | End: 2025-01-29 | Stop reason: SDUPTHER

## 2025-01-29 RX ORDER — ESOMEPRAZOLE MAGNESIUM 40 MG/1
40 CAPSULE, DELAYED RELEASE ORAL 2 TIMES DAILY
COMMUNITY

## 2025-01-29 ASSESSMENT — ENCOUNTER SYMPTOMS
IRREGULAR HEARTBEAT: 0
NEAR-SYNCOPE: 0
CLAUDICATION: 0
PND: 0
ORTHOPNEA: 0
SHORTNESS OF BREATH: 0
SNORING: 0
COUGH: 0
WHEEZING: 0
DYSPNEA ON EXERTION: 1
SYNCOPE: 0

## 2025-01-29 NOTE — PROGRESS NOTES
CARDIOLOGY OFFICE VISIT      CHIEF COMPLAINT  Chief Complaint   Patient presents with    Follow-up     Patient is present for 6 month follow up with device check          HISTORY OF PRESENT ILLNESS  HPI  69-year-old female with a past medical history of hypertension and hyperthyroidism on methimazole therapy followed by endocrinology service. Patient states that for the last 2 to 3 years she has been noticing palpitations on and off but for the last 6 months they are becoming more frequent. Patient was having emergency department visits for palpitations at some point last year. She had some EKG changes and she underwent an a stress test. During the stress that she developed angina with ST depressions. She underwent a cardiac catheterization that showed normal coronary arteries but evidence of spasm in the LAD and diagonal. Since then she was placed on isosorbide therapy. She continues having fluttering. She had an event monitor ordered in March 2023. Event monitor showed frequent episodes of atrial fibrillation with rapid ventricular response. Main rhythm was sinus rhythm. She was placed on Lopressor therapy but apparently she noticed her palpitations more more frequent and she discontinued this medication. She was placed on calcium channel blocker but also she had the same sensation of palpitations increasing with this medication and she decided to stop.  Patient was placed on beta-blocker therapy. Echocardiogram in 2020 shows normal left ventricular function value 55%.      She was placed on flecainide therapy due to persistent atrial fibrillation.  She also underwent loop recorder implantation in July 2023    Patient denies any palpitations but she started noticing some chest discomfort in the epigastric area for the last 2 to 3 weeks.  Not related with activities.    She had a cardiac catheterization in 2022 that shows normal coronary arteries with evidence of a spasm in the LAD and circumflex.    Loop recorder  interrogations have not seen any significant atrial or ventricular arrhythmias.    EKG performed today shows sinus rhythm at a rate of 76 bpm QRS ration 96 ms QT corrected 425 ms.  Rhythm strip shows the same pattern.      Past Medical History  No past medical history on file.    Social History  Social History     Tobacco Use    Smoking status: Former     Types: Cigarettes    Smokeless tobacco: Not on file   Substance Use Topics    Alcohol use: Not Currently    Drug use: Not Currently       Family History     Family History   Problem Relation Name Age of Onset    Other (cerebrovascular accident) Mother      Heart attack Father      Febrile seizures Other      Febrile seizures Sibling          Allergies:  Allergies   Allergen Reactions    Amoxicillin Shortness of breath, Hives and Other     Other Reaction(s): hives,    Other reaction(s): hives,   Other Reaction(s): hives,    Lisinopril Hives and Other    Acetaminophen Unknown    Aspirin GI intolerance, GI Upset, Hives, Other and Unknown    Cat Dander Unknown    Codeine Other, Hives and Unknown     Other Reaction(s): Elevated heart rate with elevated blood pressure and diagnosis of hypertension    Hydrocodone-Acetaminophen Other     Other Reaction(s): AOF    Keflex [Cephalexin] Unknown    Triamcinolone Unknown    Pseudoephedrine Hives, Other, Palpitations and Unknown     Other Reaction(s): Tachycardia      Other reaction(s): Other: See Comments Heart races      Heart races    Heart races    Other reaction(s): Other: See Comments   Heart races        Outpatient Medications:  Current Outpatient Medications   Medication Instructions    amLODIPine (Norvasc) 5 mg tablet 1 tablet, Daily (0630)    ascorbic acid, vitamin C, 100 mg chewable tablet 1 tablet, Daily    atorvastatin (LIPITOR) 20 mg, Daily    Bifidobacterium infantis (Align) 4 mg capsule 1 capsule, Daily    calcium carbonate-vitamin D3 500 mg-5 mcg (200 unit) tablet 1 tablet, Daily    cholecalciferol (VITAMIN D-3)  10,000 Units, Daily RT    Creon 24,000-76,000 -120,000 unit capsule TAKE 1 CAPSULE BY MOUTH 3 TIMES A DAY WITH MEAL AND SNACK    Eliquis 5 mg, oral, 2 times daily    esomeprazole (NEXIUM) 40 mg, 2 times daily    fenofibrate (Triglide) 160 mg tablet 1 tablet, Daily (0630)    ferrous sulfate 325 (65 Fe) MG EC tablet 1 tablet, Daily    FIBER, CALCIUM POLYCARBOPHIL, ORAL Gummy, Refills(s) 0    flecainide (Tambocor) 150 mg tablet oral, 2 times daily    isosorbide mononitrate ER (Imdur) 30 mg 24 hr tablet 1 tablet, 2 times daily    latanoprost (Xalatan) 0.005 % ophthalmic solution 1 drop, Nightly    lutein 20 mg, Daily RT    methIMAzole (Tapazole) 5 mg tablet TAKE 1 TABLET BY MOUTH 3 TIMES A WEEK ON MONDAY WEDNESDAY AND FRIDAY    multivitamin with minerals (multivitamin) tablet 1 tablet, Daily RT    Pepcid 20 mg, Daily    Prolia 60 mg, Every 6 months    vitamin E 45 mg (100 unit) capsule Vitamin E          REVIEW OF SYSTEMS  Review of Systems   Constitutional: Negative for malaise/fatigue.   Cardiovascular:  Positive for chest pain and dyspnea on exertion. Negative for claudication, cyanosis, irregular heartbeat, leg swelling, near-syncope, orthopnea, paroxysmal nocturnal dyspnea and syncope.        Cardiac catheterization from 2022 did not show any significant blockages per Dr. Le, most likely reoccurrence of spasms.  Pt. Also thinks this may be either stomach polyps or due to her hiatal hernia   Respiratory:  Negative for cough, shortness of breath, snoring and wheezing.    All other systems reviewed and are negative.        VITALS  Vitals:    01/29/25 0939   BP: 126/88   Pulse: 76       PHYSICAL EXAM  Constitutional:       Appearance: Normal and healthy appearance. Well-developed and not in distress.      Comments: Left sided loop recorder insertion site well healed   Neck:      Vascular: No JVR. JVD normal.   Pulmonary:      Effort: Pulmonary effort is normal.      Breath sounds: Normal breath sounds. No wheezing.  No rhonchi. No rales.   Chest:      Chest wall: Not tender to palpatation.   Cardiovascular:      PMI at left midclavicular line. Normal rate. Regular rhythm. Normal S1. Normal S2.       Murmurs: There is no murmur.      No gallop.  No click. No rub.   Pulses:     Intact distal pulses.   Edema:     Peripheral edema absent.   Abdominal:      Tenderness: There is no abdominal tenderness.   Musculoskeletal: Normal range of motion.         General: No tenderness. Skin:     General: Skin is warm and dry.   Neurological:      General: No focal deficit present.      Mental Status: Alert and oriented to person, place and time.           ASSESSMENT AND PLAN  Clinical impression     1. Palpitations  2. Evidence of atrial fibrillation on event monitor with rapid ventricular response  3. Hyperthyroidism on treatment with methimazole  4. Normal coronary arteries with evidence of coronary spasm of the LAD and diagonal by cardiac catheterization in 2022  5. Normal left ventricular function per echocardiogram in 2020  6. Hypertension  7.  High risk medication (flecainide)  8.  Status post loop recorder implantation in July 2023 with no complications    Plan recommendations    From the electrophysiologist on point she is doing well.  Will continue with high risk medication (flecainide) at a dose of 75 mg twice a day.    Continue Eliquis therapy.    Regarding epigastric discomfort, this may be related with her hiatal hernia.  Instructed to see GI or primary care physician regarding of this.  She had a cardiac catheterization in 2022 that shows no significant coronary artery disease but evidence of a spasm.  We will increase the dose of Norvasc to 10 mg daily.    Patient was instructed to check her blood pressure twice a day and bring the blood pressure into the next appointment.    Follow my office every 6 months or sooner if needed.    Follow device clinic as scheduled.    Risk factor modification and lifestyle modification  discussed with patient. Diet , exercise and hydration discussed with patient.    I have personally review with patient during this office visit, laboratory data, echocardiogram results, stress test results, Holter-event monitor results prior and after the last electrophysiology visit. All questions has been answered.    Please excuse any errors in grammar or translation related to this dictation.  Voice recognition software was utilized to prepare this document.

## 2025-01-29 NOTE — PATIENT INSTRUCTIONS
INCREASE Norvasc 10mg once a day.  Keep track of your blood pressure twice a day for a week after the increase.  First check of the day is 2 hours after morning medicines, second check of the day is just before you go to bed.  Please call the office with the readings [phone], option #4.  Follow up with your primary for your concerns of shortness of breath and chest pressure as well, as discussed, since it may be caused by your hernia or stomach polyps you have mentioned.  Follow up in 6 months  Remote loop recorder checks will occur monthly.  In clinic loop recorder checks are to be done every 6 months, prior to your appointment, on the same day.  Patient educated on proper medication use.   Patient educated on risk factor modification.   Please bring any lab results from other providers / physicians to your next appointment.     Please bring all medicines, vitamins, and herbal supplements with you when you come to the office.     Prescriptions will not be filled unless you are compliant with your follow up appointments or have a follow up appointment scheduled as per instruction of your physician. Refills should be requested at the time of your visit.  IGAYLA LPN, AM SCRIBING FOR, AND IN THE PRESENCE OF DR. GATITO PASCUAL MD

## 2025-01-31 ENCOUNTER — HOSPITAL ENCOUNTER (OUTPATIENT)
Dept: CARDIOLOGY | Facility: HOSPITAL | Age: 70
Discharge: HOME | End: 2025-01-31
Payer: COMMERCIAL

## 2025-01-31 DIAGNOSIS — Z95.818 STATUS POST PLACEMENT OF IMPLANTABLE LOOP RECORDER: ICD-10-CM

## 2025-01-31 DIAGNOSIS — I48.0 PAROXYSMAL ATRIAL FIBRILLATION (MULTI): ICD-10-CM

## 2025-02-07 ENCOUNTER — HOSPITAL ENCOUNTER (OUTPATIENT)
Dept: CARDIOLOGY | Facility: HOSPITAL | Age: 70
Discharge: HOME | End: 2025-02-07
Payer: COMMERCIAL

## 2025-02-07 DIAGNOSIS — Z95.818 STATUS POST PLACEMENT OF IMPLANTABLE LOOP RECORDER: ICD-10-CM

## 2025-02-07 DIAGNOSIS — I48.0 PAROXYSMAL ATRIAL FIBRILLATION (MULTI): ICD-10-CM

## 2025-02-11 ENCOUNTER — TELEPHONE (OUTPATIENT)
Dept: CARDIOLOGY | Facility: CLINIC | Age: 70
End: 2025-02-11
Payer: COMMERCIAL

## 2025-02-11 DIAGNOSIS — I48.0 PAROXYSMAL ATRIAL FIBRILLATION (MULTI): Primary | ICD-10-CM

## 2025-02-11 NOTE — TELEPHONE ENCOUNTER
Returned call to patient and advised her of the following options:    Xarelto 20mg once daily  Pradaxa 150mg twice daily  Or coumadin.     Patient will contact her insurance and call us back with the cheapest option.

## 2025-02-11 NOTE — TELEPHONE ENCOUNTER
Patient called and LM stating she changed insurance and now Eliquis is too expensive for her. Routed to Bere PASCAL RN

## 2025-02-13 NOTE — TELEPHONE ENCOUNTER
Patient called and LM stating generic pradaxa is going to be the best for her. Routed to Bere PASCAL RN

## 2025-02-14 ENCOUNTER — HOSPITAL ENCOUNTER (OUTPATIENT)
Dept: CARDIOLOGY | Facility: HOSPITAL | Age: 70
Discharge: HOME | End: 2025-02-14
Payer: COMMERCIAL

## 2025-02-14 DIAGNOSIS — I48.0 PAROXYSMAL ATRIAL FIBRILLATION (MULTI): ICD-10-CM

## 2025-02-14 DIAGNOSIS — Z95.818 STATUS POST PLACEMENT OF IMPLANTABLE LOOP RECORDER: ICD-10-CM

## 2025-02-14 RX ORDER — DABIGATRAN ETEXILATE 150 MG/1
150 CAPSULE ORAL 2 TIMES DAILY
Qty: 180 CAPSULE | Refills: 3 | Status: SHIPPED | OUTPATIENT
Start: 2025-02-14

## 2025-02-14 NOTE — TELEPHONE ENCOUNTER
Order placed for dabigatran 150mg twice daily in place of Eliquis due to cost. Sent to physician for signature.

## 2025-02-21 ENCOUNTER — HOSPITAL ENCOUNTER (OUTPATIENT)
Dept: CARDIOLOGY | Facility: HOSPITAL | Age: 70
Discharge: HOME | End: 2025-02-21
Payer: COMMERCIAL

## 2025-02-21 DIAGNOSIS — I48.0 PAROXYSMAL ATRIAL FIBRILLATION (MULTI): ICD-10-CM

## 2025-02-21 DIAGNOSIS — Z95.818 STATUS POST PLACEMENT OF IMPLANTABLE LOOP RECORDER: ICD-10-CM

## 2025-03-03 ENCOUNTER — TELEPHONE (OUTPATIENT)
Dept: CARDIOLOGY | Facility: CLINIC | Age: 70
End: 2025-03-03
Payer: COMMERCIAL

## 2025-03-03 DIAGNOSIS — I48.0 PAROXYSMAL ATRIAL FIBRILLATION (MULTI): ICD-10-CM

## 2025-03-03 NOTE — TELEPHONE ENCOUNTER
Recvd fax from pt. For [employer] Eliquis assistance, with her portions completed and signed.  Finished provider section and printed face sheet, insurance cards, and printed copy of RX for physician to sign.  Ashley

## 2025-03-06 NOTE — TELEPHONE ENCOUNTER
3/6/25  Faxed completed, signed application along with insurance info, and signed prescription.  Successful fax confirmation recv'd.  Placed all to be scanned to chart under media.  Ashley

## 2025-03-07 ENCOUNTER — HOSPITAL ENCOUNTER (OUTPATIENT)
Dept: CARDIOLOGY | Facility: HOSPITAL | Age: 70
Discharge: HOME | End: 2025-03-07
Payer: COMMERCIAL

## 2025-03-07 DIAGNOSIS — Z95.818 STATUS POST PLACEMENT OF IMPLANTABLE LOOP RECORDER: ICD-10-CM

## 2025-03-07 DIAGNOSIS — I48.0 PAROXYSMAL ATRIAL FIBRILLATION (MULTI): ICD-10-CM

## 2025-03-07 PROCEDURE — 93298 REM INTERROG DEV EVAL SCRMS: CPT

## 2025-03-07 PROCEDURE — 93298 REM INTERROG DEV EVAL SCRMS: CPT | Performed by: INTERNAL MEDICINE

## 2025-03-14 ENCOUNTER — HOSPITAL ENCOUNTER (OUTPATIENT)
Dept: CARDIOLOGY | Facility: HOSPITAL | Age: 70
Discharge: HOME | End: 2025-03-14
Payer: COMMERCIAL

## 2025-03-14 DIAGNOSIS — I48.0 PAROXYSMAL ATRIAL FIBRILLATION (MULTI): ICD-10-CM

## 2025-03-14 DIAGNOSIS — Z95.818 STATUS POST PLACEMENT OF IMPLANTABLE LOOP RECORDER: ICD-10-CM

## 2025-03-28 ENCOUNTER — HOSPITAL ENCOUNTER (OUTPATIENT)
Dept: CARDIOLOGY | Facility: HOSPITAL | Age: 70
Discharge: HOME | End: 2025-03-28
Payer: COMMERCIAL

## 2025-03-28 DIAGNOSIS — I48.0 PAROXYSMAL ATRIAL FIBRILLATION (MULTI): ICD-10-CM

## 2025-03-28 DIAGNOSIS — Z95.818 STATUS POST PLACEMENT OF IMPLANTABLE LOOP RECORDER: ICD-10-CM

## 2025-04-04 ENCOUNTER — HOSPITAL ENCOUNTER (OUTPATIENT)
Dept: CARDIOLOGY | Facility: HOSPITAL | Age: 70
Discharge: HOME | End: 2025-04-04
Payer: COMMERCIAL

## 2025-04-04 DIAGNOSIS — I48.0 PAROXYSMAL ATRIAL FIBRILLATION (MULTI): ICD-10-CM

## 2025-04-04 DIAGNOSIS — Z95.818 STATUS POST PLACEMENT OF IMPLANTABLE LOOP RECORDER: ICD-10-CM

## 2025-04-11 ENCOUNTER — HOSPITAL ENCOUNTER (OUTPATIENT)
Dept: CARDIOLOGY | Facility: HOSPITAL | Age: 70
Discharge: HOME | End: 2025-04-11
Payer: COMMERCIAL

## 2025-04-11 DIAGNOSIS — I48.0 PAROXYSMAL ATRIAL FIBRILLATION (MULTI): ICD-10-CM

## 2025-04-11 DIAGNOSIS — Z95.818 STATUS POST PLACEMENT OF IMPLANTABLE LOOP RECORDER: ICD-10-CM

## 2025-04-11 PROCEDURE — 93298 REM INTERROG DEV EVAL SCRMS: CPT

## 2025-04-18 ENCOUNTER — HOSPITAL ENCOUNTER (OUTPATIENT)
Dept: CARDIOLOGY | Facility: HOSPITAL | Age: 70
Discharge: HOME | End: 2025-04-18
Payer: COMMERCIAL

## 2025-04-18 DIAGNOSIS — I48.0 PAROXYSMAL ATRIAL FIBRILLATION (MULTI): ICD-10-CM

## 2025-04-18 DIAGNOSIS — Z95.818 STATUS POST PLACEMENT OF IMPLANTABLE LOOP RECORDER: ICD-10-CM

## 2025-04-25 ENCOUNTER — HOSPITAL ENCOUNTER (OUTPATIENT)
Dept: CARDIOLOGY | Facility: HOSPITAL | Age: 70
Discharge: HOME | End: 2025-04-25
Payer: COMMERCIAL

## 2025-04-25 DIAGNOSIS — Z95.818 STATUS POST PLACEMENT OF IMPLANTABLE LOOP RECORDER: ICD-10-CM

## 2025-04-25 DIAGNOSIS — I48.0 PAROXYSMAL ATRIAL FIBRILLATION (MULTI): ICD-10-CM

## 2025-05-09 ENCOUNTER — HOSPITAL ENCOUNTER (OUTPATIENT)
Dept: CARDIOLOGY | Facility: HOSPITAL | Age: 70
Discharge: HOME | End: 2025-05-09
Payer: COMMERCIAL

## 2025-05-09 DIAGNOSIS — Z95.818 STATUS POST PLACEMENT OF IMPLANTABLE LOOP RECORDER: ICD-10-CM

## 2025-05-09 DIAGNOSIS — I48.0 PAROXYSMAL ATRIAL FIBRILLATION (MULTI): ICD-10-CM

## 2025-05-16 ENCOUNTER — HOSPITAL ENCOUNTER (OUTPATIENT)
Dept: CARDIOLOGY | Facility: HOSPITAL | Age: 70
Discharge: HOME | End: 2025-05-16
Payer: COMMERCIAL

## 2025-05-16 DIAGNOSIS — I48.0 PAROXYSMAL ATRIAL FIBRILLATION (MULTI): ICD-10-CM

## 2025-05-16 DIAGNOSIS — Z95.818 STATUS POST PLACEMENT OF IMPLANTABLE LOOP RECORDER: ICD-10-CM

## 2025-05-16 PROCEDURE — 93298 REM INTERROG DEV EVAL SCRMS: CPT

## 2025-05-16 PROCEDURE — 93298 REM INTERROG DEV EVAL SCRMS: CPT | Performed by: INTERNAL MEDICINE

## 2025-05-30 ENCOUNTER — TELEPHONE (OUTPATIENT)
Dept: CARDIOLOGY | Facility: CLINIC | Age: 70
End: 2025-05-30
Payer: COMMERCIAL

## 2025-05-30 DIAGNOSIS — I48.0 PAROXYSMAL ATRIAL FIBRILLATION (MULTI): Primary | ICD-10-CM

## 2025-05-30 NOTE — TELEPHONE ENCOUNTER
Patient assistance was not approved. Will place order for Clinical pharmacy referral. Order placed and sent to physician for signature. Patient aware.

## 2025-05-30 NOTE — TELEPHONE ENCOUNTER
Patient called and LM stating she would like to apply for patient assistance or clinical pharmacy for Eliquis. Routed to Bere PASCAL RN

## 2025-06-13 NOTE — PROGRESS NOTES
"  Pharmacist Clinic: Cardiology Management    Chrissy Gerardo \"Paige\" is a 69 y.o. female was referred to Clinical Pharmacy Team for anticoagulation management.     Referring Provider: Dilan Le MD    THIS IS A NEW PATIENT APPOINTMENT. PATIENT WILL BE ESTABLISHING CARE WITH CLINICAL PHARMACY.    Appointment was completed by *** who was reached at ***.    Allergies Reviewed? Yes    Allergies[1]    Medical History[2]    Medications Ordered Prior to Encounter[3]      RELEVANT LAB RESULTS:  No results found for: \"BILITOT\", \"CALCIUM\", \"CO2\", \"CL\", \"CREATININE\", \"GLUCOSE\", \"ALKPHOS\", \"K\", \"PROT\", \"NA\", \"AST\", \"ALT\", \"BUN\", \"ANIONGAP\", \"MG\", \"PHOS\", \"GGT\", \"LDH\", \"ALBUMIN\", \"AMYLASE\", \"LIPASE\", \"GFRF\", \"GFRMALE\"  No results found for: \"TRIG\", \"CHOL\", \"LDLCALC\", \"HDL\"  No results found for: \"BMCBC\", \"CBCDIF\"     PHARMACEUTICAL ASSESSMENT:    MEDICATION RECONCILIATION    Was a medication reconciliation completed at this visit? Yes  Home Pharmacy Reviewed? Yes, describe: [facility]     Added:  - ***  Changed:  - ***  Removed:  - ***    Drug Interactions? {YES-DESCRIBE/NO:07147}    Medication Documentation Review Audit       Reviewed by Dilan Le MD (Physician) on 02/14/25 at 0956      Medication Order Taking? Sig Documenting Provider Last Dose Status   amLODIPine (Norvasc) 10 mg tablet [id number]  Take 1 tablet (10 mg) by mouth early in the morning.. Dilan Le MD  Active   ascorbic acid, vitamin C, 100 mg chewable tablet [id number] No 1 tablet (100 mg) once daily. Historical Provider, MD Taking Active   atorvastatin (Lipitor) 20 mg tablet [id number] No Take 1 tablet (20 mg) by mouth once daily. Historical Provider, MD Taking Active   Bifidobacterium infantis (Align) 4 mg capsule [id number] No 1 capsule (4 mg) once daily. Historical Provider, MD Taking Active   calcium carbonate-vitamin D3 500 mg-5 mcg (200 unit) tablet [id number] No Take 1 tablet by mouth once daily. Historical Provider, MD Taking Active "   cholecalciferol (Vitamin D-3) 5,000 Units tablet [id number] No Take 2 tablets (10,000 Units) by mouth once daily. Betarice Choi MD Taking Active   Creon 24,000-76,000 -120,000 unit capsule [id number] No TAKE 1 CAPSULE BY MOUTH 3 TIMES A DAY WITH MEAL AND SNACK Beatrice Choi MD Taking Active   denosumab (Prolia) 60 mg/mL syringe [id number] No Inject 1 mL (60 mg) under the skin every 6 months. Beatrice Choi MD Taking Active   Discontinued 25 0721   esomeprazole (NexIUM) 40 mg DR capsule [id number]  Take 1 capsule (40 mg) by mouth 2 times a day. Beatrice Choi MD  Active   fenofibrate (Triglide) 160 mg tablet [id number]  Take 1 tablet (160 mg) by mouth early in the morning.. Beatrice Choi MD  Active   ferrous sulfate 325 (65 Fe) MG EC tablet [id number] No Take 1 tablet by mouth once daily. Beatrice Choi MD Taking Active   FIBER, CALCIUM POLYCARBOPHIL, ORAL [id number] No Gummy, Refills(s) 0 Beatrice Choi MD Taking Active   flecainide (Tambocor) 150 mg tablet [id number]  TAKE 1/2 TABLET BY MOUTH TWICE A DAY Dilan Le MD  Active   isosorbide mononitrate ER (Imdur) 30 mg 24 hr tablet [id number] No Take 1 tablet (30 mg) by mouth 2 times a day. Beatrice Choi MD Taking Active   latanoprost (Xalatan) 0.005 % ophthalmic solution [id number] No Administer 1 drop into both eyes once daily at bedtime. Beatrice Choi MD Taking Active   lutein 10 mg tablet [id number] No Take 20 mg by mouth once daily. Beatrice Choi MD Taking Active   methIMAzole (Tapazole) 5 mg tablet [id number] No TAKE 1 TABLET BY MOUTH 3 TIMES A WEEK ON  AND FRIDAY Beatrice Choi MD Taking Active   multivitamin with minerals (multivitamin) tablet [id number] No Take 1 tablet by mouth once daily. Beatrice Choi MD Taking Active   Pepcid 20 mg tablet [id number] No Take 1 tablet (20 mg) by mouth once daily. Beatrice Choi MD Taking  25 4572   vitamin E 45  mg (100 unit) capsule [id number] No Vitamin E Historical Provider, MD Taking Active                    DISEASE MANAGEMENT ASSESSMENT:     ANTICOAGULATION ASSESSMENT    DIAGNOSIS: prevention of nonvalvular atrial fibrilliation stroke and systemic embolism  - Patient is projected to be on anticoagulation life long     The ASCVD Risk score (Afua HOPE, et al., 2019) failed to calculate for the following reasons:    Cannot find a previous HDL lab    Cannot find a previous total cholesterol lab    ARCELIA VASC SCORING CALCULATOR:   [id number] Stroke Risk Points: 3   Values used to calculate this score:    Points  Metrics       0        Has Congestive Heart Failure: No       1        Has Hypertension: Yes       1        Age: 69       0        Has Diabetes: No       0        Had Stroke: No                 Had TIA: No                 Had Thromboembolism: No       0        Has Vascular Disease: No       1        Clinically Relevant Sex: Female    Lip GH, et al. 2009. © 2010 American [employer]     CURRENT PHARMACOTHERAPY:    Eliquis 5mg BID   70 yo  67.3 kg  Needs labs for SCr     RELEVANT PAST MEDICAL HISTORY:   HTN, HLD, a fib    Affordability/Accessibility: Eliquis $414/[email or web address]  Adherence/Organization: ***  Adverse Reactions: ***  Recent Hospitalizations: No ***  Recent Falls/Trauma: ***  Changes in Tobacco or Alcohol Intake:   Tobacco: ***  Alcohol: ***    EDUCATION/COUNSELING:   - Counseled patient on MOA, expectations, duration of therapy, contraindications, administration, and monitoring parameters  - Counseled patient of side effects that are indicative of bleeding such as dark tarry stool, unexplainable bruising, or vomiting up a coffee ground like substance      DISCUSSION/NOTES:   Today was an initial visit to establish with clinical pharmacy. Patient medications and allergies were reviewed and updated.  ***    ASSESSMENT:   Patient Assistance Program (PAP)    Application for program to be submitted  for the following medications: Eliquis     Prescription Insurance:  Yes   Paid Test Claim:  Yes   County of Permanent Address:  ***   Members of Household:  1***   Files Taxes:  {YES/NO:97297}     Patient will be {financialpaperwork:19644}    Patient verbally reports monthly or yearly income which is less than 400% federal poverty level    Patient aware this process may take up to 6 weeks.     If approved medication must be filled through  {UHRETAILPHARMACY:33630} and {DELIVERYORPICKUP:69951}.      Assessment/Plan   Problem List Items Addressed This Visit    None        RECOMMENDATIONS/PLAN:    CONTINUE  Eliquis 5mg BID  Follow up     Last Appnt with Referring Provider: 1/29/25  Next Appnt with Referring Provider: 7/30/25  Clinical Pharmacist follow up: ***  VAF/Application Expiration: {YES/DATE/NO:37701}  Type of Encounter: Virtual    Lilliam RubinD    Verbal consent to manage patient's drug therapy was obtained from the patient . They were informed they may decline to participate or withdraw from participation in pharmacy services at any time.    Continue all meds under the continuation of care with the referring provider and clinical pharmacy team.           [1]   Allergies  Allergen Reactions    Amoxicillin Shortness of breath, Hives and Other     Other Reaction(s): hives,    Other reaction(s): hives,   Other Reaction(s): hives,    Lisinopril Hives and Other    Acetaminophen Unknown    Aspirin GI intolerance, GI Upset, Hives, Other and Unknown    Cat Dander Unknown    Codeine Other, Hives and Unknown     Other Reaction(s): Elevated heart rate with elevated blood pressure and diagnosis of hypertension    Hydrocodone-Acetaminophen Other     Other Reaction(s): AOF    Keflex [Cephalexin] Unknown    Triamcinolone Unknown    Pseudoephedrine Hives, Other, Palpitations and Unknown     Other Reaction(s): Tachycardia      Other reaction(s): Other: See Comments Heart races      Heart races    Heart  races    Other reaction(s): Other: See Comments   Heart races   [2] No past medical history on file.  [3]   Current Outpatient Medications on File Prior to Visit   Medication Sig Dispense Refill    amLODIPine (Norvasc) 10 mg tablet Take 1 tablet (10 mg) by mouth early in the morning.. 90 tablet 3    apixaban (Eliquis) 5 mg tablet Take 1 tablet (5 mg) by mouth 2 times a day. 180 tablet 3    apixaban (Eliquis) 5 mg tablet Take 1 tablet (5 mg) by mouth 2 times a day. 180 tablet 3    ascorbic acid, vitamin C, 100 mg chewable tablet 1 tablet (100 mg) once daily.      atorvastatin (Lipitor) 20 mg tablet Take 1 tablet (20 mg) by mouth once daily.      Bifidobacterium infantis (Align) 4 mg capsule 1 capsule (4 mg) once daily.      calcium carbonate-vitamin D3 500 mg-5 mcg (200 unit) tablet Take 1 tablet by mouth once daily.      cholecalciferol (Vitamin D-3) 5,000 Units tablet Take 2 tablets (10,000 Units) by mouth once daily.      Creon 24,000-76,000 -120,000 unit capsule TAKE 1 CAPSULE BY MOUTH 3 TIMES A DAY WITH MEAL AND SNACK      denosumab (Prolia) 60 mg/mL syringe Inject 1 mL (60 mg) under the skin every 6 months.      esomeprazole (NexIUM) 40 mg DR capsule Take 1 capsule (40 mg) by mouth 2 times a day.      fenofibrate (Triglide) 160 mg tablet Take 1 tablet (160 mg) by mouth early in the morning..      ferrous sulfate 325 (65 Fe) MG EC tablet Take 1 tablet by mouth once daily.      FIBER, CALCIUM POLYCARBOPHIL, ORAL Gummy, Refills(s) 0      flecainide (Tambocor) 150 mg tablet TAKE 1/2 TABLET BY MOUTH TWICE A DAY 90 tablet 3    isosorbide mononitrate ER (Imdur) 30 mg 24 hr tablet Take 1 tablet (30 mg) by mouth 2 times a day.      latanoprost (Xalatan) 0.005 % ophthalmic solution Administer 1 drop into both eyes once daily at bedtime.      lutein 10 mg tablet Take 20 mg by mouth once daily.      methIMAzole (Tapazole) 5 mg tablet TAKE 1 TABLET BY MOUTH 3 TIMES A WEEK ON MONDAY WEDNESDAY AND FRIDAY      multivitamin  with minerals (multivitamin) tablet Take 1 tablet by mouth once daily.      Pepcid 20 mg tablet Take 1 tablet (20 mg) by mouth once daily.      vitamin E 45 mg (100 unit) capsule Vitamin E       No current facility-administered medications on file prior to visit.      (Xalatan) 0.005 % ophthalmic solution Administer 1 drop into both eyes once daily at bedtime.      lutein 10 mg tablet Take 20 mg by mouth once daily.      methIMAzole (Tapazole) 5 mg tablet TAKE 1 TABLET BY MOUTH 3 TIMES A WEEK ON MONDAY WEDNESDAY AND FRIDAY (Patient taking differently: Take 1 tablet (5 mg) by mouth 3 (three) times a week.)      multivitamin with minerals (multivitamin) tablet Take 1 tablet by mouth once daily.      omega-3 acid ethyl esters (Lovaza) 1 gram capsule Take 1,200 mg by mouth 2 times a day.      Pepcid 20 mg tablet Take 1 tablet (20 mg) by mouth once daily. (Patient taking differently: Take 1 tablet (20 mg) by mouth once daily as needed for indigestion or heartburn.)      timolol (Betimol) 0.25 % ophthalmic solution Administer 1-2 drops into both eyes once daily.      apixaban (Eliquis) 5 mg tablet Take 1 tablet (5 mg) by mouth 2 times a day. (Patient not taking: Reported on 6/16/2025) 180 tablet 3    vitamin E 45 mg (100 unit) capsule Vitamin E (Patient not taking: Reported on 6/16/2025)       No current facility-administered medications on file prior to visit.

## 2025-06-16 ENCOUNTER — APPOINTMENT (OUTPATIENT)
Dept: PHARMACY | Facility: HOSPITAL | Age: 70
End: 2025-06-16
Payer: COMMERCIAL

## 2025-06-16 DIAGNOSIS — I48.0 PAROXYSMAL ATRIAL FIBRILLATION (MULTI): Primary | ICD-10-CM

## 2025-06-16 DIAGNOSIS — I48.0 PAROXYSMAL ATRIAL FIBRILLATION (MULTI): ICD-10-CM

## 2025-06-16 RX ORDER — OMEGA-3-ACID ETHYL ESTERS 1 G/1
1200 CAPSULE, LIQUID FILLED ORAL 2 TIMES DAILY
COMMUNITY

## 2025-06-16 NOTE — ASSESSMENT & PLAN NOTE
CONTINUE Eliquis 5mg BID (dosed appropriately) for VTE/CVA prophylaxis.  70 yo  67.3 kg  Needs BMP, being collected in Aug by PCP   Monitor for abnormal bruising and bleeding.

## 2025-06-16 NOTE — Clinical Note
Adri, Spoke to Paige today about her Eliquis. She reports adherence with no abnormal bruising/bleeding. She should qualify for PAP once financial documents are received. Plan to follow up in 1 month.

## 2025-06-20 ENCOUNTER — HOSPITAL ENCOUNTER (OUTPATIENT)
Dept: CARDIOLOGY | Facility: HOSPITAL | Age: 70
Discharge: HOME | End: 2025-06-20
Payer: COMMERCIAL

## 2025-06-20 DIAGNOSIS — I48.0 PAROXYSMAL ATRIAL FIBRILLATION (MULTI): ICD-10-CM

## 2025-06-20 DIAGNOSIS — Z95.818 STATUS POST PLACEMENT OF IMPLANTABLE LOOP RECORDER: ICD-10-CM

## 2025-06-20 PROCEDURE — 93298 REM INTERROG DEV EVAL SCRMS: CPT | Performed by: INTERNAL MEDICINE

## 2025-06-20 PROCEDURE — 93298 REM INTERROG DEV EVAL SCRMS: CPT

## 2025-06-23 ENCOUNTER — TELEPHONE (OUTPATIENT)
Dept: PHARMACY | Facility: HOSPITAL | Age: 70
End: 2025-06-23
Payer: COMMERCIAL

## 2025-06-23 PROCEDURE — RXMED WILLOW AMBULATORY MEDICATION CHARGE

## 2025-06-23 NOTE — TELEPHONE ENCOUNTER
Patient Assistance Program Approval:     We are pleased to inform you that your application for assistance has been approved.     This approval is valid through 6/23/26 as long as the following criteria continue to be satisfied:     Your medication (Eliquis) remains covered under your current insurance plan.   Your prescriber does not discontinue therapy.   You do not seek reimbursement from any other private or government-funded programs for the  medication.    Under this program, the pharmacy will first bill your insurance plan for your indemnified specified medication. The [employer] will then offset your copay balance, so that your out-of pocket expense for your specialty medication will be $0.00.    Bessie Steel, PharmD

## 2025-06-25 ENCOUNTER — PHARMACY VISIT (OUTPATIENT)
Dept: PHARMACY | Facility: CLINIC | Age: 70
End: 2025-06-25
Payer: MEDICARE

## 2025-07-11 ENCOUNTER — HOSPITAL ENCOUNTER (OUTPATIENT)
Dept: CARDIOLOGY | Facility: HOSPITAL | Age: 70
Discharge: HOME | End: 2025-07-11
Payer: COMMERCIAL

## 2025-07-11 DIAGNOSIS — I48.0 PAROXYSMAL ATRIAL FIBRILLATION (MULTI): ICD-10-CM

## 2025-07-11 DIAGNOSIS — Z95.818 STATUS POST PLACEMENT OF IMPLANTABLE LOOP RECORDER: ICD-10-CM

## 2025-07-14 ENCOUNTER — TELEPHONE (OUTPATIENT)
Dept: CARDIOLOGY | Facility: CLINIC | Age: 70
End: 2025-07-14
Payer: COMMERCIAL

## 2025-07-14 NOTE — TELEPHONE ENCOUNTER
Patient called office and left message that the monitor she has for her Loop recorder that she presses when she feels something, has been lost for 3 days.  She sates she has looked all over for it and cannot find it.  She was wondering who to contact for a replacement if possible.  Routed to Bere for follow up.

## 2025-07-17 NOTE — PROGRESS NOTES
"  Pharmacist Clinic: Cardiology Management    Chrissy Gerardo \"Paige\" is a 69 y.o. female was referred to Clinical Pharmacy Team for anticoagulation management.     Referring Provider: Dilan Le MD    THIS IS A FOLLOW UP PATIENT APPOINTMENT. AT LAST VISIT ON 6/16/25 WITH PHARMACIST (Bessie Steel).    Appointment was completed by Paige who was reached at primary number.    REVIEW OF PAST APPNT (IF APPLICABLE):   Last visit was an initial visit to establish with clinical pharmacy. Patient medications and allergies were reviewed and updated.  Patient reports adherence to Eliquis with no complaints. She denies abnormal bruising/bleeding, recent hospitalizations, or falls.  Since visit patient has been approved for  PAP till 6/23/26.      Allergies Reviewed? No    Allergies[1]    Medical History[2]    Medications Ordered Prior to Encounter[3]      RELEVANT LAB RESULTS:  No results found for: \"BILITOT\", \"CALCIUM\", \"CO2\", \"CL\", \"CREATININE\", \"GLUCOSE\", \"ALKPHOS\", \"K\", \"PROT\", \"NA\", \"AST\", \"ALT\", \"BUN\", \"ANIONGAP\", \"MG\", \"PHOS\", \"GGT\", \"LDH\", \"ALBUMIN\", \"AMYLASE\", \"LIPASE\", \"GFRF\", \"GFRMALE\"  No results found for: \"TRIG\", \"CHOL\", \"LDLCALC\", \"HDL\"  No results found for: \"BMCBC\", \"CBCDIF\"     PHARMACEUTICAL ASSESSMENT:    MEDICATION RECONCILIATION    Was a medication reconciliation completed at this visit? No  Home Pharmacy Reviewed? Yes, describe: [facility]     Drug Interactions? No    Medication Documentation Review Audit       Reviewed by Bessie Steel, PharmD (Pharmacist) on 06/16/25 at 1029      Medication Order Taking? Sig Documenting Provider Last Dose Status   amLODIPine (Norvasc) 10 mg tablet [id number] Yes Take 1 tablet (10 mg) by mouth early in the morning.. Dilan Le MD  Active   apixaban (Eliquis) 5 mg tablet [id number] Yes Take 1 tablet (5 mg) by mouth 2 times a day. Dilan Le MD  Active   apixaban (Eliquis) 5 mg tablet [id number]  Take 1 tablet (5 mg) by mouth 2 times a " day.   Patient not taking: Reported on 6/16/2025    Dilan Le MD  Active   ascorbic acid, vitamin C, 100 mg chewable tablet [id number] Yes 1 tablet (100 mg) once daily. Historical Provider, MD  Active   atorvastatin (Lipitor) 20 mg tablet [id number] Yes Take 1 tablet (20 mg) by mouth once daily. Beatrice Choi MD  Active   Bifidobacterium infantis (Align) 4 mg capsule [id number] Yes 1 capsule (4 mg) once daily. Historical Provider, MD  Active   calcium carbonate-vitamin D3 500 mg-5 mcg (200 unit) tablet [id number] Yes Take 1 tablet by mouth once daily. Historical Provider, MD  Active   cholecalciferol (Vitamin D-3) 5,000 Units tablet [id number] Yes Take 2 tablets (10,000 Units) by mouth once daily. Historical MD Jimbo  Active   Creon 24,000-76,000 -120,000 unit capsule [id number] Yes TAKE 1 CAPSULE BY MOUTH 3 TIMES A DAY WITH MEAL AND SNACK   Patient taking differently: Take 1 capsule by mouth 3 times daily (morning, midday, late afternoon).    Historical MD Jimbo  Active   denosumab (Prolia) 60 mg/mL syringe [id number] Yes Inject 1 mL (60 mg) under the skin every 6 months. Beatrice Choi MD  Active   esomeprazole (NexIUM) 40 mg DR capsule [id number] Yes Take 1 capsule (40 mg) by mouth 2 times a day.   Patient taking differently: Take 1 capsule (40 mg) by mouth once daily in the morning. Take before meals.    Beatrice Choi MD  Active   fenofibrate (Triglide) 160 mg tablet [id number] Yes Take 1 tablet (160 mg) by mouth early in the morning.. Beatrice Choi MD  Active   ferrous sulfate 325 (65 Fe) MG EC tablet [id number] Yes Take 1 tablet by mouth once daily. Historical Provider, MD  Active   FIBER, CALCIUM POLYCARBOPHIL, ORAL [id number] Yes Gummy, Refills(s) 0   Patient taking differently: Take 2 tablets by mouth once daily.    Beatrice Choi MD  Active   flecainide (Tambocor) 150 mg tablet [id number] Yes TAKE 1/2 TABLET BY MOUTH TWICE A DAY   Patient taking differently: Take 0.5  tablets (75 mg) by mouth 2 times a day.    Dilan Le MD  Active   isosorbide mononitrate ER (Imdur) 30 mg 24 hr tablet [id number] Yes Take 1 tablet (30 mg) by mouth 2 times a day. Historical Provider, MD  Active   latanoprost (Xalatan) 0.005 % ophthalmic solution [id number] Yes Administer 1 drop into both eyes once daily at bedtime. Historical Provider, MD  Active   lutein 10 mg tablet [id number] Yes Take 20 mg by mouth once daily. Historical Provider, MD  Active   methIMAzole (Tapazole) 5 mg tablet [id number] Yes TAKE 1 TABLET BY MOUTH 3 TIMES A WEEK ON MONDAY WEDNESDAY AND FRIDAY   Patient taking differently: Take 1 tablet (5 mg) by mouth 3 (three) times a week.    Historical MD Jimbo  Active   multivitamin with minerals (multivitamin) tablet [id number] Yes Take 1 tablet by mouth once daily. Historical Provider, MD  Active   omega-3 acid ethyl esters (Lovaza) 1 gram capsule [id number] Yes Take 1,200 mg by mouth 2 times a day. Historical Provider, MD  Active   Pepcid 20 mg tablet [id number] Yes Take 1 tablet (20 mg) by mouth once daily.   Patient taking differently: Take 1 tablet (20 mg) by mouth once daily as needed for indigestion or heartburn.    Historical Provider, MD  Active   timolol (Betimol) 0.25 % ophthalmic solution [id number] Yes Administer 1-2 drops into both eyes once daily. Historical Provider, MD  Active   vitamin E 45 mg (100 unit) capsule [id number]  Vitamin E   Patient not taking: Reported on 6/16/2025    Historical Provider, MD  Active                    DISEASE MANAGEMENT ASSESSMENT:     ANTICOAGULATION ASSESSMENT     DIAGNOSIS: prevention of nonvalvular atrial fibrilliation stroke and systemic embolism  - Patient is projected to be on anticoagulation life long      The ASCVD Risk score (Afua DK, et al., 2019) failed to calculate for the following reasons:    Cannot find a previous HDL lab    Cannot find a previous total cholesterol lab     ARCELIA VASC SCORING CALCULATOR:   [id number] Stroke  Risk Points: 3   Values used to calculate this score:    Points  Metrics       0        Has Congestive Heart Failure: No       1        Has Hypertension: Yes       1        Age: 69       0        Has Diabetes: No       0        Had Stroke: No                 Had TIA: No                 Had Thromboembolism: No       0        Has Vascular Disease: No       1        Clinically Relevant Sex: Female     Jessica MALDONADO, et al. 2009. © 2010 [employer]      CURRENT PHARMACOTHERAPY:    Eliquis 5mg BID   70 yo  67.3 kg  Will see PCP in Aug for BMP      RELEVANT PAST MEDICAL HISTORY:   HTN, HLD, a fib     Affordability/Accessibility: [facility]   Adherence/Organization: reports adherence   Adverse Reactions: none reported   Recent Hospitalizations: No   Recent Falls/Trauma: none reported   Changes in Tobacco or Alcohol Intake:   Tobacco: none   Alcohol: none      EDUCATION/COUNSELING:   - Counseled patient on MOA, expectations, duration of therapy, contraindications, administration, and monitoring parameters  - Counseled patient of side effects that are indicative of bleeding such as dark tarry stool, unexplainable bruising, or vomiting up a coffee ground like substance    DISCUSSION/NOTES:   Since last visit patient has been enrolled in [facility] till 6/23/26 and has received the medication. She reports adherence and denies abnormal bruising/bleeding, hospitalizations, or falls.   Will follow up in 6 months per patient request.     ASSESSMENT:    Assessment/Plan   Problem List Items Addressed This Visit       Paroxysmal atrial fibrillation (Multi) - Primary    CONTINUE Eliquis 5mg BID (dosed appropriately) for VTE/CVA prophylaxis.  70 yo  67.3 kg  Needs BMP, being collected in Aug by PCP   Monitor for abnormal bruising and bleeding.           Relevant Orders    Referral to Clinical Pharmacy       RECOMMENDATIONS/PLAN:    CONTINUE   Eliquis 5mg BID   Follow up in 6 months     Last Appnt with Referring Provider:  1/29/25  Next Appnt with Referring Provider: 7/30/25  Clinical Pharmacist follow up: 1/19/26  VAF/Application Expiration: Yes    Date: 6/23/26  Type of Encounter: Kaylah Steel PharmD    Verbal consent to manage patient's drug therapy was obtained from the patient . They were informed they may decline to participate or withdraw from participation in pharmacy services at any time.    Continue all meds under the continuation of care with the referring provider and clinical pharmacy team.           [1]   Allergies  Allergen Reactions    Amoxicillin Hives, Shortness of breath and Other     Other Reaction(s): hives,    Other reaction(s): hives,   Other Reaction(s): hives,    Lisinopril Hives    Aspirin Hives, Other, GI intolerance, Unknown and GI Upset     Stomach upset    Cat Dander Unknown    Codeine Hives, Other and Unknown     Other Reaction(s): Elevated heart rate with elevated blood pressure and diagnosis of hypertension    Hydrocodone-Acetaminophen Hives     High heart rate     Keflex [Cephalexin] Hives    Triamcinolone Hives     Heart also raced    Pseudoephedrine Hives, Palpitations, Other and Unknown     Other Reaction(s): Tachycardia      Other reaction(s): Other: See Comments Heart races      Heart races    Heart races    Other reaction(s): Other: See Comments   Heart races   [2]   Past Medical History:  Diagnosis Date    Allergic     Arthritis     Cataract     GERD (gastroesophageal reflux disease)     Glaucoma     HL (hearing loss)     Hypertension    [3]   Current Outpatient Medications on File Prior to Visit   Medication Sig Dispense Refill    amLODIPine (Norvasc) 10 mg tablet Take 1 tablet (10 mg) by mouth early in the morning.. 90 tablet 3    apixaban (Eliquis) 5 mg tablet Take 1 tablet (5 mg) by mouth 2 times a day. 180 tablet 3    ascorbic acid, vitamin C, 100 mg chewable tablet 1 tablet (100 mg) once daily.      atorvastatin (Lipitor) 20 mg tablet Take 1 tablet (20 mg) by mouth  once daily.      Bifidobacterium infantis (Align) 4 mg capsule 1 capsule (4 mg) once daily.      calcium carbonate-vitamin D3 500 mg-5 mcg (200 unit) tablet Take 1 tablet by mouth once daily.      cholecalciferol (Vitamin D-3) 5,000 Units tablet Take 2 tablets (10,000 Units) by mouth once daily.      Creon 24,000-76,000 -120,000 unit capsule TAKE 1 CAPSULE BY MOUTH 3 TIMES A DAY WITH MEAL AND SNACK (Patient taking differently: Take 1 capsule by mouth 3 times daily (morning, midday, late afternoon).)      denosumab (Prolia) 60 mg/mL syringe Inject 1 mL (60 mg) under the skin every 6 months.      esomeprazole (NexIUM) 40 mg DR capsule Take 1 capsule (40 mg) by mouth 2 times a day. (Patient taking differently: Take 1 capsule (40 mg) by mouth once daily in the morning. Take before meals.)      fenofibrate (Triglide) 160 mg tablet Take 1 tablet (160 mg) by mouth early in the morning..      ferrous sulfate 325 (65 Fe) MG EC tablet Take 1 tablet by mouth once daily.      FIBER, CALCIUM POLYCARBOPHIL, ORAL Gummy, Refills(s) 0 (Patient taking differently: Take 2 tablets by mouth once daily.)      flecainide (Tambocor) 150 mg tablet TAKE 1/2 TABLET BY MOUTH TWICE A DAY (Patient taking differently: Take 0.5 tablets (75 mg) by mouth 2 times a day.) 90 tablet 3    isosorbide mononitrate ER (Imdur) 30 mg 24 hr tablet Take 1 tablet (30 mg) by mouth 2 times a day.      latanoprost (Xalatan) 0.005 % ophthalmic solution Administer 1 drop into both eyes once daily at bedtime.      lutein 10 mg tablet Take 20 mg by mouth once daily.      methIMAzole (Tapazole) 5 mg tablet TAKE 1 TABLET BY MOUTH 3 TIMES A WEEK ON MONDAY WEDNESDAY AND FRIDAY (Patient taking differently: Take 1 tablet (5 mg) by mouth 3 (three) times a week.)      multivitamin with minerals (multivitamin) tablet Take 1 tablet by mouth once daily.      omega-3 acid ethyl esters (Lovaza) 1 gram capsule Take 1,200 mg by mouth 2 times a day.      Pepcid 20 mg tablet Take 1  tablet (20 mg) by mouth once daily. (Patient taking differently: Take 1 tablet (20 mg) by mouth once daily as needed for indigestion or heartburn.)      timolol (Betimol) 0.25 % ophthalmic solution Administer 1-2 drops into both eyes once daily.      vitamin E 45 mg (100 unit) capsule Vitamin E (Patient not taking: Reported on 6/16/2025)       No current facility-administered medications on file prior to visit.

## 2025-07-21 ENCOUNTER — OFFICE VISIT (OUTPATIENT)
Age: 70
End: 2025-07-21
Payer: COMMERCIAL

## 2025-07-21 ENCOUNTER — APPOINTMENT (OUTPATIENT)
Dept: PHARMACY | Facility: HOSPITAL | Age: 70
End: 2025-07-21
Payer: COMMERCIAL

## 2025-07-21 VITALS
HEIGHT: 64 IN | SYSTOLIC BLOOD PRESSURE: 122 MMHG | HEART RATE: 55 BPM | DIASTOLIC BLOOD PRESSURE: 64 MMHG | BODY MASS INDEX: 24.24 KG/M2 | WEIGHT: 142 LBS

## 2025-07-21 DIAGNOSIS — E05.20 GOITER, TOXIC, MULTINODULAR: ICD-10-CM

## 2025-07-21 DIAGNOSIS — I48.0 PAROXYSMAL ATRIAL FIBRILLATION (MULTI): Primary | ICD-10-CM

## 2025-07-21 DIAGNOSIS — E05.90 HYPERTHYROIDISM: Primary | ICD-10-CM

## 2025-07-21 PROCEDURE — 1126F AMNT PAIN NOTED NONE PRSNT: CPT | Performed by: INTERNAL MEDICINE

## 2025-07-21 PROCEDURE — 1159F MED LIST DOCD IN RCRD: CPT | Performed by: INTERNAL MEDICINE

## 2025-07-21 PROCEDURE — 1123F ACP DISCUSS/DSCN MKR DOCD: CPT | Performed by: INTERNAL MEDICINE

## 2025-07-21 PROCEDURE — 99213 OFFICE O/P EST LOW 20 MIN: CPT | Performed by: INTERNAL MEDICINE

## 2025-07-21 RX ORDER — AMLODIPINE BESYLATE 10 MG/1
10 TABLET ORAL DAILY
COMMUNITY
Start: 2025-07-05

## 2025-07-21 ASSESSMENT — ENCOUNTER SYMPTOMS: EYES NEGATIVE: 1

## 2025-07-21 NOTE — Clinical Note
Hello, · Since last visit patient has been enrolled in  PAP till 6/23/26 and has received the medication. She reports adherence and denies abnormal bruising/bleeding, hospitalizations, or falls.  · Will follow up in 6 months per patient request.

## 2025-07-25 ENCOUNTER — HOSPITAL ENCOUNTER (OUTPATIENT)
Dept: CARDIOLOGY | Facility: HOSPITAL | Age: 70
Discharge: HOME | End: 2025-07-25
Payer: COMMERCIAL

## 2025-07-25 DIAGNOSIS — Z95.818 STATUS POST PLACEMENT OF IMPLANTABLE LOOP RECORDER: ICD-10-CM

## 2025-07-25 DIAGNOSIS — I48.0 PAROXYSMAL ATRIAL FIBRILLATION (MULTI): ICD-10-CM

## 2025-07-25 PROCEDURE — 93298 REM INTERROG DEV EVAL SCRMS: CPT

## 2025-07-30 ENCOUNTER — APPOINTMENT (OUTPATIENT)
Dept: CARDIOLOGY | Facility: CLINIC | Age: 70
End: 2025-07-30
Payer: COMMERCIAL

## 2025-07-30 ENCOUNTER — TELEPHONE (OUTPATIENT)
Age: 70
End: 2025-07-30

## 2025-07-30 ENCOUNTER — HOSPITAL ENCOUNTER (OUTPATIENT)
Dept: CARDIOLOGY | Facility: HOSPITAL | Age: 70
Discharge: HOME | End: 2025-07-30
Payer: COMMERCIAL

## 2025-07-30 VITALS
WEIGHT: 145 LBS | SYSTOLIC BLOOD PRESSURE: 122 MMHG | DIASTOLIC BLOOD PRESSURE: 64 MMHG | HEART RATE: 63 BPM | HEIGHT: 64 IN | BODY MASS INDEX: 24.75 KG/M2

## 2025-07-30 DIAGNOSIS — I48.0 PAROXYSMAL ATRIAL FIBRILLATION (MULTI): ICD-10-CM

## 2025-07-30 DIAGNOSIS — Z95.818 STATUS POST PLACEMENT OF IMPLANTABLE LOOP RECORDER: ICD-10-CM

## 2025-07-30 DIAGNOSIS — Z95.818 STATUS POST PLACEMENT OF IMPLANTABLE LOOP RECORDER: Primary | ICD-10-CM

## 2025-07-30 DIAGNOSIS — Z95.818 PRESENCE OF OTHER CARDIAC IMPLANTS AND GRAFTS: ICD-10-CM

## 2025-07-30 DIAGNOSIS — Z87.891 FORMER SMOKER: ICD-10-CM

## 2025-07-30 DIAGNOSIS — Z95.818 PRESENCE OF CARDIAC DEVICE: ICD-10-CM

## 2025-07-30 DIAGNOSIS — I48.92 ATRIAL FLUTTER, UNSPECIFIED TYPE (MULTI): ICD-10-CM

## 2025-07-30 DIAGNOSIS — R00.2 PALPITATIONS: ICD-10-CM

## 2025-07-30 PROCEDURE — 99215 OFFICE O/P EST HI 40 MIN: CPT | Performed by: INTERNAL MEDICINE

## 2025-07-30 PROCEDURE — 3008F BODY MASS INDEX DOCD: CPT | Performed by: INTERNAL MEDICINE

## 2025-07-30 PROCEDURE — 93000 ELECTROCARDIOGRAM COMPLETE: CPT | Mod: DISTINCT PROCEDURAL SERVICE | Performed by: INTERNAL MEDICINE

## 2025-07-30 PROCEDURE — 93291 INTERROG DEV EVAL SCRMS IP: CPT | Performed by: INTERNAL MEDICINE

## 2025-07-30 PROCEDURE — 93291 INTERROG DEV EVAL SCRMS IP: CPT

## 2025-07-30 PROCEDURE — 1159F MED LIST DOCD IN RCRD: CPT | Performed by: INTERNAL MEDICINE

## 2025-07-30 RX ORDER — FLECAINIDE ACETATE 150 MG/1
75 TABLET ORAL 2 TIMES DAILY
Qty: 90 TABLET | Refills: 3 | Status: SHIPPED | OUTPATIENT
Start: 2025-07-30

## 2025-07-30 RX ORDER — METOPROLOL TARTRATE 25 MG/1
TABLET, FILM COATED ORAL
Qty: 30 TABLET | Refills: 1 | Status: SHIPPED | OUTPATIENT
Start: 2025-07-30

## 2025-07-30 RX ORDER — METHIMAZOLE 5 MG/1
TABLET ORAL
Qty: 45 TABLET | Refills: 2 | Status: SHIPPED | OUTPATIENT
Start: 2025-07-30

## 2025-07-30 ASSESSMENT — ENCOUNTER SYMPTOMS
DYSPNEA ON EXERTION: 0
PALPITATIONS: 1

## 2025-07-30 NOTE — PROGRESS NOTES
CARDIOLOGY OFFICE VISIT      CHIEF COMPLAINT  Chief Complaint   Patient presents with    Follow-up     6 mos with device check       HISTORY OF PRESENT ILLNESS  HPI  69-year-old female with a past medical history of hypertension and hyperthyroidism on methimazole therapy followed by endocrinology service. Patient states that for the last 2 to 3 years she has been noticing palpitations on and off but for the last 6 months they are becoming more frequent. Patient was having emergency department visits for palpitations at some point last year. She had some EKG changes and she underwent an a stress test. During the stress that she developed angina with ST depressions. She underwent a cardiac catheterization that showed normal coronary arteries but evidence of spasm in the LAD and diagonal. Since then she was placed on isosorbide therapy. She continues having fluttering. She had an event monitor ordered in March 2023. Event monitor showed frequent episodes of atrial fibrillation with rapid ventricular response. Main rhythm was sinus rhythm. She was placed on Lopressor therapy but apparently she noticed her palpitations more more frequent and she discontinued this medication. She was placed on calcium channel blocker but also she had the same sensation of palpitations increasing with this medication and she decided to stop.  Patient was placed on beta-blocker therapy. Echocardiogram in 2020 shows normal left ventricular function value 55%.      She was placed on flecainide therapy due to persistent atrial fibrillation.  She also underwent loop recorder implantation in July 2023      She had a cardiac catheterization in 2022 that shows normal coronary arteries with evidence of a spasm in the LAD and circumflex.    Patient states that she continues having palpitations most of the times when she is exercising.  Sometimes she works from home and she feels her heart beating fast.    Loop recorder interrogations have not seen  any significant atrial or ventricular arrhythmias associated with symptoms.    EKG performed today shows sinus rhythm with rate of 63 bpm QRS duration 88 ms QT corrected 429 ms.  Rhythm strip shows the same pattern.    Patient still using flecainide therapy 75 mg twice a day.  She is on Eliquis therapy.  She also is on Tapazole 5 mg 1 tablet 3 times a week followed by endocrinology service.    Her last TSH in May 2025 0.294            Past Medical History  Medical History[1]    Social History  Social History[2]    Family History   Family History[3]     Allergies:  RX Allergies[4]     Outpatient Medications:  Current Outpatient Medications   Medication Instructions    amLODIPine (NORVASC) 10 mg, oral, Daily (0630)    ascorbic acid, vitamin C, 100 mg chewable tablet 1 tablet, Daily    atorvastatin (LIPITOR) 20 mg, Daily    Bifidobacterium infantis (Align) 4 mg capsule 1 capsule, Daily    calcium carbonate-vitamin D3 500 mg-5 mcg (200 unit) tablet 1 tablet, Daily    cholecalciferol (VITAMIN D-3) 10,000 Units, Daily RT    Creon 24,000-76,000 -120,000 unit capsule TAKE 1 CAPSULE BY MOUTH 3 TIMES A DAY WITH MEAL AND SNACK    Eliquis 5 mg, oral, 2 times daily    esomeprazole (NEXIUM) 40 mg, 2 times daily    fenofibrate (Triglide) 160 mg tablet 1 tablet, Daily (0630)    ferrous sulfate 325 (65 Fe) MG EC tablet 1 tablet, Daily    FIBER, CALCIUM POLYCARBOPHIL, ORAL 4grams, take 2 chews daily    flecainide (Tambocor) 150 mg tablet oral, 2 times daily    isosorbide mononitrate ER (Imdur) 30 mg 24 hr tablet 1 tablet, 2 times daily    latanoprost (Xalatan) 0.005 % ophthalmic solution 1 drop, Nightly    lutein 20 mg, Daily RT    methIMAzole (Tapazole) 5 mg tablet TAKE 1 TABLET BY MOUTH 3 TIMES A WEEK ON MONDAY WEDNESDAY AND FRIDAY    multivitamin with minerals (multivitamin) tablet 1 tablet, Daily RT    omega-3 acid ethyl esters (LOVAZA) 1,200 mg, 2 times daily    Pepcid 20 mg, As needed    Prolia 60 mg, Every 6 months    timolol  (Betimol) 0.25 % ophthalmic solution 1-2 drops, Daily          REVIEW OF SYSTEMS  Review of Systems   Cardiovascular:  Positive for palpitations. Negative for chest pain and dyspnea on exertion.   All other systems reviewed and are negative.        VITALS  Vitals:    07/30/25 0944   BP: 122/64   Pulse: 63       PHYSICAL EXAM  Constitutional:       General: Awake.      Appearance: Normal and healthy appearance. Well-developed and not in distress.   Neck:      Vascular: No JVR. JVD normal.   Pulmonary:      Effort: Pulmonary effort is normal.      Breath sounds: Normal breath sounds. No wheezing. No rhonchi. No rales.   Chest:      Chest wall: Not tender to palpatation.      Comments: Loop recorder in place  Cardiovascular:      PMI at left midclavicular line. Normal rate. Regular rhythm. Normal S1. Normal S2.       Murmurs: There is no murmur.      No gallop.  No click. No rub.   Pulses:     Intact distal pulses.   Edema:     Peripheral edema absent.   Abdominal:      Tenderness: There is no abdominal tenderness.   Musculoskeletal: Normal range of motion.         General: No tenderness. Skin:     General: Skin is warm and dry.   Neurological:      General: No focal deficit present.      Mental Status: Alert and oriented to person, place and time.         ASSESSMENT AND PLAN   Clinical impression     1. Palpitations  2. Evidence of atrial fibrillation on event monitor with rapid ventricular response  3. Hyperthyroidism on treatment with methimazole  4. Normal coronary arteries with evidence of coronary spasm of the LAD and diagonal by cardiac catheterization in 2022  5. Normal left ventricular function per echocardiogram in 2020  6. Hypertension  7.  High risk medication (flecainide)  8.  Status post loop recorder implantation in July 2023 with no complications    Plan recommendation    From the electrophysiologist on point she is doing well.  Symptoms associated with palpitations not related with any significant  atrial or ventricular arrhythmias by loop recorder interrogations.  This may be progress with her hyperthyroidism.  Her TSH is still with low values.  Patient needs to discuss adjustment of medical therapy with endocrinology service most likely symptoms coming from abnormal thyroid values.    Follow my office every 6 months or sooner if needed.    Follow device clinic as scheduled.    Risk factor modification and lifestyle modification discussed with patient. Diet , exercise and hydration discussed with patient.    I have personally review with patient during this office visit, laboratory data, echocardiogram results, stress test results, Holter-event monitor results prior and after the last electrophysiology visit. All questions has been answered.    Please excuse any errors in grammar or translation related to this dictation.  Voice recognition software was utilized to prepare this document.      I, Dr. Le , personally performed the services described in the documentation as scribed by the nurse in my presence, and confirm it is both accurate and complete.            [1]   Past Medical History:  Diagnosis Date    Allergic     Arthritis     Cataract     GERD (gastroesophageal reflux disease)     Glaucoma     HL (hearing loss)     Hypertension    [2]   Social History  Tobacco Use    Smoking status: Former     Types: Cigarettes    Smokeless tobacco: Not on file   Substance Use Topics    Alcohol use: Not Currently    Drug use: Not Currently   [3]   Family History  Problem Relation Name Age of Onset    Other (cerebrovascular accident) Mother      Heart attack Father      Febrile seizures Other      Febrile seizures Sibling     [4]   Allergies  Allergen Reactions    Amoxicillin Hives, Shortness of breath and Other     Other Reaction(s): hives,    Other reaction(s): hives,   Other Reaction(s): hives,    Lisinopril Hives    Aspirin Hives, Other, GI intolerance, Unknown and GI Upset     Stomach upset    Cat Dander  Unknown    Codeine Hives, Other and Unknown     Other Reaction(s): Elevated heart rate with elevated blood pressure and diagnosis of hypertension    Hydrocodone-Acetaminophen Hives     High heart rate     Keflex [Cephalexin] Hives    Triamcinolone Hives     Heart also raced    Pseudoephedrine Hives, Palpitations, Other and Unknown     Other Reaction(s): Tachycardia      Other reaction(s): Other: See Comments Heart races      Heart races    Heart races    Other reaction(s): Other: See Comments   Heart races

## 2025-07-30 NOTE — PATIENT INSTRUCTIONS
Continue same medications/treatment.  Patient educated on proper medication use.  Patient educated on risk factor modification.  Please bring any lab results from other providers/physicians to your next appointment.    Please bring all medicines, vitamins, and herbal supplements with you when you come to the office.    Prescriptions will not be filled unless you are compliant with your follow up appointments or have a follow up appointment scheduled as per instruction of your physician. Refills should be requested at the time of your visit.    START metoprolol tartrate 25mg 1-2 times daily as needed for breakthrough palpitations.  Follow up with our physician assistant, Pallavi, in 6 months with device check  Continue monthly remote checks    Bere CAMARA RN, AM SCRIBING FOR, AND IN THE PRESENCE OF DR. GATITO PASCUAL MD

## 2025-07-30 NOTE — TELEPHONE ENCOUNTER
Pt had her labs done in July they are scanned into her chart.  She had an appt with her cardiologist (Dr Garza) cause she is experiencing some issues she thought were cardiac related.  He told her that he feels it is thyroid related and her labs were off and to contact the office and see if you think she should have any dose adjustments

## 2025-09-05 ENCOUNTER — HOSPITAL ENCOUNTER (OUTPATIENT)
Dept: CARDIOLOGY | Facility: HOSPITAL | Age: 70
Discharge: HOME | End: 2025-09-05
Payer: COMMERCIAL

## 2025-09-05 DIAGNOSIS — Z95.818 STATUS POST PLACEMENT OF IMPLANTABLE LOOP RECORDER: ICD-10-CM

## 2025-09-05 PROCEDURE — 93298 REM INTERROG DEV EVAL SCRMS: CPT

## 2026-01-19 ENCOUNTER — APPOINTMENT (OUTPATIENT)
Dept: PHARMACY | Facility: HOSPITAL | Age: 71
End: 2026-01-19
Payer: COMMERCIAL

## 2026-01-28 ENCOUNTER — APPOINTMENT (OUTPATIENT)
Dept: CARDIOLOGY | Facility: CLINIC | Age: 71
End: 2026-01-28
Payer: COMMERCIAL

## (undated) DEVICE — GLOVE ORANGE PI 7 1/2   MSG9075

## (undated) DEVICE — DEVICE INFL PRSS G INDIC DISP (MUST BE PURC IN MULTIPLES OF 5)

## (undated) DEVICE — SYSTEM ILLUMINATION L100CM SNUS RELIEVA LUMA SENTRY

## (undated) DEVICE — SUTURE CHROMIC GUT SZ 4-0 L18IN ABSRB BRN L13MM P-3 3/8 CIR 1654G

## (undated) DEVICE — KIT,ANTI FOG,W/SPONGE & FLUID,SOFT PACK: Brand: MEDLINE

## (undated) DEVICE — LABEL MED MINI W/ MARKER

## (undated) DEVICE — TOWEL,OR,DSP,ST,BLUE,STD,4/PK,20PK/CS: Brand: MEDLINE

## (undated) DEVICE — BLADE 1882040HR 5PK M4 INF TURB 2MM ROT: Brand: STRAIGHTSHOT

## (undated) DEVICE — ELECTRODE NDL L6.5IN S STL VERSATILE REUSE

## (undated) DEVICE — Device

## (undated) DEVICE — ELECTROSURGICAL PENCIL BUTTON SWITCH E-Z CLEAN COATED BLADE ELECTRODE 10 FT (3 M) CORD HOLSTER: Brand: MEGADYNE

## (undated) DEVICE — SYRINGE MED 10ML TRNSLUC BRL PLUNG BLK MRK POLYPR CTRL

## (undated) DEVICE — DRAPE SURG W41XL74IN CLR FULL SZ C ARM 3 ADH POLY STRP E

## (undated) DEVICE — GLOVE SURG SZ 8 THK118MIL BLK LTX FREE POLYISOPRENE BEAD CUF

## (undated) DEVICE — GAUZE,SPONGE,2"X2",8PLY,STERILE,LF,2'S: Brand: MEDLINE

## (undated) DEVICE — SYRINGE MED 30ML STD CLR PLAS LUERLOCK TIP N CTRL DISP

## (undated) DEVICE — COUNTER NDL 40 COUNT HLD 70 FOAM BLK ADH W/ MAG

## (undated) DEVICE — INTENDED FOR TISSUE SEPARATION, AND OTHER PROCEDURES THAT REQUIRE A SHARP SURGICAL BLADE TO PUNCTURE OR CUT.: Brand: BARD-PARKER ® CARBON RIB-BACK BLADES

## (undated) DEVICE — CONTAINER,SPECIMEN,OR STERILE,4OZ: Brand: MEDLINE

## (undated) DEVICE — CATHETER ENDOSCP M-110 TIP SNUS GUID RELIEVA FLX

## (undated) DEVICE — DBD-PACK,EENT,SIRUS,PK II: Brand: MEDLINE

## (undated) DEVICE — SPONGE GZ W4XL4IN COT 12 PLY TYP VII WVN C FLD DSGN

## (undated) DEVICE — TUBING, SUCTION, 1/4" X 10', STRAIGHT: Brand: MEDLINE

## (undated) DEVICE — GAUZE,SPONGE,4"X4",16PLY,XRAY,STRL,LF: Brand: MEDLINE

## (undated) DEVICE — NEEDLE HYPO 25GA L1.5IN BLU POLYPR HUB S STL REG BVL STR

## (undated) DEVICE — CATHETER SNUS BLLN L16MM DIA6MM HI PERF DIL INT SNUS IRRIG

## (undated) DEVICE — ELECTRODE PT RET AD L9FT HI MOIST COND ADH HYDRGEL CORDED

## (undated) DEVICE — PAD N ADH W3XL4IN POLY COT SFT PERF FLM EASILY CUT ABSRB

## (undated) DEVICE — SUTURE PERMAHAND SZ 2-0 L30IN NONABSORBABLE BLK L60MM KS 623H

## (undated) DEVICE — CODMAN® SURGICAL PATTIES 1/2" X 3" (1.27CM X 7.62CM): Brand: CODMAN®